# Patient Record
Sex: FEMALE | Race: WHITE | NOT HISPANIC OR LATINO | Employment: FULL TIME | URBAN - METROPOLITAN AREA
[De-identification: names, ages, dates, MRNs, and addresses within clinical notes are randomized per-mention and may not be internally consistent; named-entity substitution may affect disease eponyms.]

---

## 2017-01-30 LAB — HPV 16/18: NORMAL

## 2017-09-05 ENCOUNTER — OFFICE VISIT (OUTPATIENT)
Dept: URGENT CARE | Facility: CLINIC | Age: 37
End: 2017-09-05
Payer: COMMERCIAL

## 2017-09-05 VITALS
WEIGHT: 145 LBS | BODY MASS INDEX: 21.98 KG/M2 | SYSTOLIC BLOOD PRESSURE: 104 MMHG | HEART RATE: 73 BPM | DIASTOLIC BLOOD PRESSURE: 73 MMHG | OXYGEN SATURATION: 100 % | RESPIRATION RATE: 18 BRPM | TEMPERATURE: 97 F | HEIGHT: 68 IN

## 2017-09-05 DIAGNOSIS — R30.0 DYSURIA: ICD-10-CM

## 2017-09-05 DIAGNOSIS — N39.0 URINARY TRACT INFECTION WITHOUT HEMATURIA, SITE UNSPECIFIED: Primary | ICD-10-CM

## 2017-09-05 LAB
B-HCG UR QL: NEGATIVE
BILIRUB UR QL STRIP: NEGATIVE
CTP QC/QA: YES
GLUCOSE UR QL STRIP: NEGATIVE
KETONES UR QL STRIP: NEGATIVE
LEUKOCYTE ESTERASE UR QL STRIP: NEGATIVE
PH, POC UA: 5 (ref 5–8)
POC BLOOD, URINE: NEGATIVE
POC NITRATES, URINE: POSITIVE
PROT UR QL STRIP: NEGATIVE
SP GR UR STRIP: 1.02 (ref 1–1.03)
UROBILINOGEN UR STRIP-ACNC: 1 (ref 0.1–1.1)

## 2017-09-05 PROCEDURE — 99214 OFFICE O/P EST MOD 30 MIN: CPT | Mod: 25,S$GLB,, | Performed by: FAMILY MEDICINE

## 2017-09-05 PROCEDURE — 3008F BODY MASS INDEX DOCD: CPT | Mod: S$GLB,,, | Performed by: FAMILY MEDICINE

## 2017-09-05 PROCEDURE — 81003 URINALYSIS AUTO W/O SCOPE: CPT | Mod: QW,S$GLB,, | Performed by: FAMILY MEDICINE

## 2017-09-05 PROCEDURE — 81025 URINE PREGNANCY TEST: CPT | Mod: QW,S$GLB,, | Performed by: FAMILY MEDICINE

## 2017-09-05 RX ORDER — BUPROPION HYDROCHLORIDE 300 MG/1
300 TABLET ORAL DAILY
COMMUNITY
End: 2017-11-03 | Stop reason: SDUPTHER

## 2017-09-05 RX ORDER — NITROFURANTOIN 25; 75 MG/1; MG/1
100 CAPSULE ORAL 2 TIMES DAILY
Qty: 14 CAPSULE | Refills: 0 | Status: SHIPPED | OUTPATIENT
Start: 2017-09-05 | End: 2017-09-12

## 2017-09-05 RX ORDER — PHENAZOPYRIDINE HYDROCHLORIDE 100 MG/1
100 TABLET, FILM COATED ORAL 3 TIMES DAILY PRN
Qty: 9 TABLET | Refills: 0 | Status: SHIPPED | OUTPATIENT
Start: 2017-09-05 | End: 2017-09-08

## 2017-09-05 RX ORDER — ESCITALOPRAM OXALATE 20 MG/1
20 TABLET ORAL DAILY
COMMUNITY
End: 2017-11-03

## 2017-09-05 NOTE — PATIENT INSTRUCTIONS
Urinary Tract Infections in Women    Urinary tract infections (UTIs) are most often caused by bacteria (germs). These bacteria enter the urinary tract. The bacteria may come from outside the body. Or they may travel from the skin outside the rectum or vagina into the urethra. Female anatomy makes it easier for bacteria from the bowel to enter a womans urinary tract, which is the most common source of UTI. This means women develop UTIs more often than men. Pain in or around the urinary tract is a common UTI symptom. But the only way to know for sure if you have a UTI for the health care provider to test your urine. The two tests that may be done are the urinalysis and urine culture.  Types of UTIs  · Cystitis: A bladder infection (cystitis) is the most common UTI in women. You may have urgent or frequent urination. You may also have pain, burning when you urinate, and bloody urine.  · Urethritis: This is an inflamed urethra, which is the tube that carries urine from the bladder to outside the body. You may have lower stomach or back pain. You may also have urgent or frequent urination.  · Pyelonephritis: This is a kidney infection. If not treated, it can be serious and damage your kidneys. In severe cases, you may be hospitalized. You may have a fever and lower back pain.  Medications to treat a UTI  Most UTIs are treated with antibiotics. These kill the bacteria. The length of time you need to take them depends on the type of infection. It may be as short as 3 days. If you have repeated UTIs, a low-dose antibiotic may be needed for several months. Take antibiotics exactly as directed. Dont stop taking them until all of the medication is gone. If you stop taking the antibiotic too soon, the infection may not go away, and you may develop a resistance to the antibiotic. This can make it much harder to treat.  Lifestyle changes to treat and prevent UTIs  The lifestyle changes below will help get rid of your UTI.  They may also help prevent future UTIs.  · Drink plenty of fluids. This includes water, juice, or other caffeine-free drinks. Fluids help flush bacteria out of your body.  · Empty your bladder. Always empty your bladder when you feel the urge to urinate. And always urinate before going to sleep. Urine that stays in your bladder can lead to infection. Try to urinate before and after sex as well.  · Practice good personal hygiene. Wipe yourself from front to back after using the toilet. This helps keep bacteria from getting into the urethra.  · Use condoms during sex. These help prevent UTIs caused by sexually transmitted bacteria. Also, avoid using spermicides during sex. These can increase the risk of UTIs. Choose other forms of birth control instead. For women who tend to get UTIs after sex, a low-dose of a preventive antibiotic may be used. Be sure to discuss this option with your health care provider.  · Follow up with your health care provider as directed. He or she may test to make sure the infection has cleared. If necessary, additional treatment may be started.  Date Last Reviewed: 9/8/2014  © 4522-4357 [employer]. [address] [place] [address], [place] [address]. All rights reserved. This information is not intended as a substitute for professional medical care. Always follow your healthcare professional's instructions.

## 2017-09-05 NOTE — PROGRESS NOTES
"Subjective:       Patient ID: Cierra Irby is a 37 y.o. female.    Vitals:  height is 5' 8" (1.727 m) and weight is 65.8 kg (145 lb). Her oral temperature is 97.4 °F (36.3 °C). Her blood pressure is 104/73 and her pulse is 73. Her respiration is 18 and oxygen saturation is 100%.     Chief Complaint: Dysuria    Dysuria    This is a new problem. The current episode started acute onset. The problem has been gradually worsening. The quality of the pain is described as burning and aching. The pain is at a severity of 7/10. The pain is moderate. There has been no fever. Associated symptoms include nausea, urgency and vomiting. Pertinent negatives include no chills, discharge, flank pain, hematuria or possible pregnancy. Treatments tried: azo. The treatment provided no relief. There is no history of hypertension, recurrent UTIs, a single kidney or STD.     Review of Systems   Constitution: Negative for chills and fever.   Musculoskeletal: Negative for back pain.   Gastrointestinal: Positive for abdominal pain, nausea and vomiting.   Genitourinary: Positive for dysuria and urgency. Negative for flank pain, genital sores, hematuria, missed menses and non-menstrual bleeding.       Objective:      Physical Exam   Constitutional: She appears well-developed and well-nourished.   HENT:   Mouth/Throat: No posterior oropharyngeal erythema.   Cardiovascular: Normal rate, regular rhythm and normal heart sounds.    Pulmonary/Chest: Effort normal and breath sounds normal.   Abdominal: Soft. Bowel sounds are normal. There is no tenderness.   Nursing note and vitals reviewed.      Results for orders placed or performed in visit on 09/05/17   POCT Urinalysis, Dipstick, Automated, W/O Scope   Result Value Ref Range    POC Blood, Urine Negative Negative    POC Bilirubin, Urine Negative Negative    POC Urobilinogen, Urine 1.0 0.1 - 1.1    POC Ketones, Urine Negative Negative    POC Protein, Urine Negative Negative    POC Nitrates, Urine " Positive (A) Negative    POC Glucose, Urine Negative Negative    pH, UA 5.0 5 - 8    POC Specific Gravity, Urine 1.020 1.003 - 1.029    POC Leukocytes, Urine Negative Negative   POCT urine pregnancy   Result Value Ref Range    POC Preg Test, Ur Negative Negative     Acceptable Yes      Assessment:       1. Urinary tract infection without hematuria, site unspecified    2. Dysuria        Plan:         Urinary tract infection without hematuria, site unspecified  -     Urine culture  -     nitrofurantoin, macrocrystal-monohydrate, (MACROBID) 100 MG capsule; Take 1 capsule (100 mg total) by mouth 2 (two) times daily.  Dispense: 14 capsule; Refill: 0  -     phenazopyridine (PYRIDIUM) 100 MG tablet; Take 1 tablet (100 mg total) by mouth 3 (three) times daily as needed for Pain.  Dispense: 9 tablet; Refill: 0    Dysuria  -     POCT Urinalysis, Dipstick, Automated, W/O Scope  -     POCT urine pregnancy    advised patient that UA is NOT consistent with UTI, to start antibiotics if culture positive. Will call with results. Pyridium in the interim. Followup with worsening ysmptoms

## 2017-09-09 LAB
BACTERIA UR CULT: NO GROWTH
BACTERIA UR CULT: NORMAL

## 2017-09-10 ENCOUNTER — TELEPHONE (OUTPATIENT)
Dept: URGENT CARE | Facility: CLINIC | Age: 37
End: 2017-09-10

## 2017-09-10 NOTE — TELEPHONE ENCOUNTER
----- Message from Dara Carlson MA sent at 9/9/2017  9:21 AM CDT -----      ----- Message -----  From: Andres Gregorio MD  Sent: 9/9/2017   9:09 AM  To: [facility] Clinical Support, #    These results are normal. Please notify the patient.

## 2017-09-12 ENCOUNTER — TELEPHONE (OUTPATIENT)
Dept: URGENT CARE | Facility: CLINIC | Age: 37
End: 2017-09-12

## 2017-09-12 NOTE — TELEPHONE ENCOUNTER
----- Message from Dara Carlson MA sent at 9/9/2017  9:21 AM CDT -----      ----- Message -----  From: Andres Gregorio MD  Sent: 9/9/2017   9:09 AM  To: [facility] Clinical Support, #    These results are normal. Please notify the patient.

## 2017-09-12 NOTE — TELEPHONE ENCOUNTER
----- Message from Dara Carlson MA sent at 9/9/2017  9:21 AM CDT -----      ----- Message -----  From: Andres Gregorio MD  Sent: 9/9/2017   9:09 AM  To: [facility] Clinical Support, #    These results are normal. Please notify the patient.

## 2017-09-18 ENCOUNTER — OFFICE VISIT (OUTPATIENT)
Dept: NEUROLOGY | Facility: CLINIC | Age: 37
End: 2017-09-18
Payer: COMMERCIAL

## 2017-09-18 VITALS
DIASTOLIC BLOOD PRESSURE: 71 MMHG | BODY MASS INDEX: 22.32 KG/M2 | SYSTOLIC BLOOD PRESSURE: 103 MMHG | HEART RATE: 58 BPM | HEIGHT: 68 IN | WEIGHT: 147.25 LBS

## 2017-09-18 DIAGNOSIS — G43.709 CHRONIC MIGRAINE WITHOUT AURA, WITHOUT MENTION OF INTRACTABLE MIGRAINE WITHOUT MENTION OF STATUS MIGRAINOSUS: Primary | ICD-10-CM

## 2017-09-18 DIAGNOSIS — G47.9 TROUBLE IN SLEEPING: ICD-10-CM

## 2017-09-18 DIAGNOSIS — N20.0 KIDNEY STONES: ICD-10-CM

## 2017-09-18 DIAGNOSIS — G43.709 CHRONIC MIGRAINE WITHOUT AURA WITHOUT STATUS MIGRAINOSUS, NOT INTRACTABLE: Primary | ICD-10-CM

## 2017-09-18 DIAGNOSIS — Z87.442 HISTORY OF KIDNEY STONES: ICD-10-CM

## 2017-09-18 PROCEDURE — 99999 PR PBB SHADOW E&M-EST. PATIENT-LVL III: CPT | Mod: PBBFAC,,, | Performed by: NURSE PRACTITIONER

## 2017-09-18 PROCEDURE — 99205 OFFICE O/P NEW HI 60 MIN: CPT | Mod: S$GLB,,, | Performed by: NURSE PRACTITIONER

## 2017-09-18 PROCEDURE — 3008F BODY MASS INDEX DOCD: CPT | Mod: S$GLB,,, | Performed by: NURSE PRACTITIONER

## 2017-09-18 RX ORDER — ONDANSETRON 4 MG/1
4 TABLET, ORALLY DISINTEGRATING ORAL
COMMUNITY
End: 2018-02-02 | Stop reason: ALTCHOICE

## 2017-09-18 RX ORDER — ALPRAZOLAM 0.5 MG/1
0.5 TABLET ORAL 2 TIMES DAILY PRN
COMMUNITY
End: 2017-11-03

## 2017-09-18 RX ORDER — MECLIZINE HYDROCHLORIDE 25 MG/1
25 TABLET ORAL 3 TIMES DAILY PRN
COMMUNITY
End: 2018-05-24 | Stop reason: SDUPTHER

## 2017-09-18 RX ORDER — ZOLMITRIPTAN 5 MG/1
SPRAY NASAL
Qty: 12 EACH | Refills: 5 | Status: SHIPPED | OUTPATIENT
Start: 2017-09-18 | End: 2017-11-03

## 2017-09-18 NOTE — PATIENT INSTRUCTIONS
Supplements to Consider:  Vitamin B2 400 mg   Co-Q10 200 mg     - Please download Migraine Rory aracelis on phone and begin tracking your headaches

## 2017-09-18 NOTE — PROGRESS NOTES
"SUBJECTIVE:  Patient ID: Cierra Irby   MRN: [id number]  Referred By: Aaarefagustin Self    Chief Complaint: Consult    History of Present Illness:   37 y.o. female with history of migraines, anxiety, and vertigo, presents to clinic alone for evaluation of her headaches.  Headaches initially began when she was in , but then this went away until 2002, when her headaches returned.  Recently moved here from [place], previous patient of [facility] headache center.  She is currently doing Botox, started in May, 3rd round of Botox due in October.  Botox was doing well for treatment, but with the stress of her move to [place], she feels things have kind of gotten mixed up.  She states "this has been one of the worst years I have had, it hasn't been this bad since 2009."  Got very sick in February and had infusion treatments done. Migraines are always right sided, in her forehead, temple, but can be occipitally located.  Pain is described as throbbing, feels like she can feel her veins pulsing.  Onset can be over 30 minutes, but can be longer like hours, depending on what type of headaches she is having.  When she gets a headache, she tries not to take anything, just to prevent the bounce backs. In the past she has taken Hydrocodone and other pain medicatinos. States Hydrocodone works the most, but she tries not to take it as it makes her feel itchy.  Headaches are at least 5x/week, gets a full blown migraine about 1-2 times per week.  Migraines last at least 24 hours, but can be up to 3 days.  Since beginning the Botox, they have not been lasting 3 days.  Before she started the Botox, she could not get out of the cycle of her headache.  In the past she has experienced an aura, but does not typically experience an aura any longer.  Headaches are typically worse from August to March, and are worsened with movement.  Pain can be anywhere from 3-10 out of 10.  She is currently able to bear children and is not taking " oral contraceptive pills, however she is not trying to get pregnant and her and her boyfriend do use protection.  Associated Symptoms - nausea, vertigo, photo/phonophobia, cannot think, vomit, neck pain  Triggers - stress, rain, flying, lemon, detergent smells, strong perfume smells, rollercoaster   Aggravating Factors - moving, looking up and down, any noise/smells/lights   Alleviating Factors - an ice cap, peppermint oil helps with nausea, laying down, eating helps   Head Trauma? Infection? Fever? Cancer? Pregnancy? <-- had 3 minor concussions and then another 4 years ago   Recent Changes - Recently moved from [place] to [place] (for boyfriend),   Sleep - does better when she takes the Zanaflex, does not feel rested in the morning, sleeps on average 6-7 hours per night   Family Hx of Migraines (mom)  Positives in bold: Hx of Kidney Stones, asthma, GI bleed (stomach ulcers in the past), osteoporosis, CAD/MI, CVA/TIA, DM     Treatments Tried and Response  Imitrex - no help, made her feel worse  Maxalt - no  Zomig PO - no  Sprix - kind of worked  Cambia - no   Topamax - gave her many kidney stones   Lithium - was given for her migraine   Wellbutrin - for anxiety  Lexapro - for anxiety   Verapamil - no help   Depakote - no   Magnesium - has to look at dose   Zanaflex - +/-    Social History  Alcohol - yes, rarely, wine is a trigger   Smoke - denies   Recreational Drug Use- denies     Current Medications:    alprazolam (XANAX) 0.5 MG tablet, Take 0.5 mg by mouth 2 (two) times daily as needed for Anxiety., Disp: , Rfl:     meclizine (ANTIVERT) 25 mg tablet, Take 25 mg by mouth 3 (three) times daily as needed., Disp: , Rfl:     ondansetron (ZOFRAN ODT) 4 MG TbDL, Take 4 mg by mouth every 12 (twelve) hours., Disp: , Rfl:     buPROPion (WELLBUTRIN XL) 300 MG 24 hr tablet, Take 300 mg by mouth once daily., Disp: , Rfl:     escitalopram oxalate (LEXAPRO) 20 MG tablet, Take 20 mg by mouth once daily., Disp: , Rfl:     " zolmitriptan (ZOMIG) 5 mg Spry, 1 spray at onset of migraine, can repeat 2 hours later if needed.  No more than 2 sprays/day, no more than 3 days/week., Disp: 12 each, Rfl: 5    Current Facility-Administered Medications:     [START ON 10/19/2017] onabotulinumtoxina injection 200 Units, 200 Units, Intramuscular, Q90 Days, KIRA Bowers    Review of Systems   Review of Systems   Constitutional: Positive for fatigue. Negative for activity change, chills, diaphoresis and fever.   HENT: Negative for congestion, ear pain, facial swelling, hearing loss, rhinorrhea, sinus pressure, tinnitus and voice change.         +phonophobia   Eyes: Positive for photophobia and visual disturbance. Negative for pain, discharge and redness.   Respiratory: Negative for chest tightness and shortness of breath.    Cardiovascular: Negative for chest pain and palpitations.   Gastrointestinal: Positive for nausea and vomiting.   Genitourinary: Negative for difficulty urinating and dysuria.   Musculoskeletal: Positive for neck pain and neck stiffness. Negative for back pain, gait problem and myalgias.   Skin: Negative for pallor.   Allergic/Immunologic: Negative for immunocompromised state.   Neurological: Positive for dizziness and headaches. Negative for facial asymmetry, weakness, light-headedness and numbness.   Psychiatric/Behavioral: Positive for decreased concentration and sleep disturbance. Negative for agitation, behavioral problems, confusion, dysphoric mood and hallucinations. The patient is nervous/anxious. The patient is not hyperactive.      OBJECTIVE:  Vitals:  /71   Pulse (!) 58   Ht 5' 8" (1.727 m)   Wt 66.8 kg (147 lb 4.3 oz)   BMI 22.39 kg/m²     Physical Exam   Constitutional: She appears well-developed and well-nourished. She is well groomed. NAD  HENT:    Head: Normocephalic and atraumatic, oral and nasal mucosa intact.  Frontalis was NTTP, temporalis was NTTP   Eyes: Conjunctivae and EOM are normal. " Pupils are equal, round, and reactive to light   Neck: Neck supple.Occiput and trapezius NTTP   Musculoskeletal: Normal range of motion. No joint stiffness. No vertebral point tenderness.  Skin: Skin is warm and dry.  Psychiatric: Normal mood and affect.     Neuro exam:    Mental status:  The patient is awake, alert, and oriented to person, place and time.  Language is intact and fluent  Remote and recent memory are intact  Normal attention and concentration  Mood is stable    Cranial Nerves:  Fundoscopic examination does not reveal any occult papilledema.    Pupils are equal and reactive to light.    Extraocular movements are intact and without nystagmus.    Visual fields are full to confrontation testing.   Facial movement is symmetric.  Facial sensation is intact.    Hearing is intact to finger rub   Uvula in midline. Tongue in midline without fasiculation  FROM of neck in all (6) directions.  Shoulder shrug symmetrical.    Coordination:     Finger to nose - normal and symmetric bilaterally   Heel to shin test - normal and symmetric bilaterally     Motor:  Normal muscle bulk and symmetry. No fasciculations were noted.   Tremor not apparent   Pronator drift not apparent.    strength was strong and symmetric   Finger extension strength was strong and symmetric   RUE:appropriate against gravity and medium force as tested 5-/5  LUE: appropriate against gravity and medium force as tested 5-/5  RLE:appropriate against gravity and medium force as tested 5-/5              LLE: appropriate against gravity and medium force as tested 5-/5    Reflexes:  Right Brachioradialis 2+  Left Brachioradialis 2+  Right Biceps 2+  Left Biceps 2+  Right Patellar2+  Left Patellar 2+  Right Achilles 2+  Left Achilles 2+                          Plantar flexion bilat   Cevallos was negative      Sensory:  RUE  intact light touch and temperature  LUE intact light touch and temperature    RLE intact light touch  LLE intact light  touch    Gait:   Romberg - negative   Normal gait  Tandem, Heel, and Toe Walk - able to perform without difficulty     ASSESSMENT:  1. Chronic migraine without aura without status migrainosus, not intractable    2. Trouble in sleeping    3. History of kidney stones      Medical Decision Making:  BOTOX  The patient has chronic migraines (G43.719) and suffers from headaches more than 15 days a month lasting more than 4 hours a day with no relief of symptoms despite trying multiple medications including Imitrex, maxalt, Zomig, wellbutrin, lexapro, verapamil, sprix, Cambia, topamax, lithium, wellbutrin, lexapro, verapamil, depakote, Magnesium, and Zanaflex among others. Botox treatment was approved for chronic migraines in October 2010. The patient will be an ideal candidate for Botox. We are planning for 3 treatments 3 months apart and aiming for at least 50% improvement in the symptoms. If we see no improvement after 3 treatments, we will discontinue the injections.    PLAN:  - Seek approval for Botox injections for Chronic migraine   - For migraine abortive - trial Zomig nasal spray   1 spray at onset of migraine, can repeat 2 hours later if needed   No more than 2 sprays/day or 3 days/week   - Supplements to consider - Vitamin B2 400 mg & Co-Q10 200 mg daily   - Start tracking headaches via Migraine Rory aracelis on phone   - Continue all other medications as directed   - RTC in 1 month or sooner if needed     Orders Placed This Encounter    zolmitriptan (ZOMIG) 5 mg Spry     I have discussed realistic goals of care with patient at length as well as medication options, and need for lifestyle adjustment. I have explained that treatment will take time. We have agreed that the goal will be to reduce frequency/intensity/quantity of HA, not to be completely HA free. I have explained my non narcotic policy regarding headache treatment.    Patient to track frequency of headaches.  Patient agreeable to work on lifestyle  adjustments.    Discussed potential for teratogenicity with treatment, patient understands if her family planning status should change she will contact office immediately and we will safely adjust medications as needed.     I spent 60 minutes face-to-face with the patient with >50% of the time spent with counseling and education regarding:  - results of data, diagnosis, and recommendations stated above  - risks, benefits, and potential side effects of Zomig nasal spray and Botox injections  - importance of healthy diet, regular exercise and sleep hygiene in the treatment of headaches      All questions and concerns addressed.  Patient verbalizes understanding and is agreeable to the plan.       Ellen Bueno, FNP-C  [facility]  [phone]    Dr. Wheat was available during today's encounter.

## 2017-09-20 PROBLEM — N20.0 KIDNEY STONES: Status: ACTIVE | Noted: 2017-09-20

## 2017-09-27 ENCOUNTER — PATIENT MESSAGE (OUTPATIENT)
Dept: NEUROLOGY | Facility: CLINIC | Age: 37
End: 2017-09-27

## 2017-09-30 ENCOUNTER — OFFICE VISIT (OUTPATIENT)
Dept: URGENT CARE | Facility: CLINIC | Age: 37
End: 2017-09-30
Payer: COMMERCIAL

## 2017-09-30 VITALS
TEMPERATURE: 98 F | WEIGHT: 140 LBS | BODY MASS INDEX: 21.22 KG/M2 | OXYGEN SATURATION: 100 % | HEIGHT: 68 IN | SYSTOLIC BLOOD PRESSURE: 106 MMHG | HEART RATE: 66 BPM | DIASTOLIC BLOOD PRESSURE: 65 MMHG | RESPIRATION RATE: 18 BRPM

## 2017-09-30 DIAGNOSIS — G43.709 CHRONIC MIGRAINE WITHOUT AURA WITHOUT STATUS MIGRAINOSUS, NOT INTRACTABLE: Primary | ICD-10-CM

## 2017-09-30 PROCEDURE — 99214 OFFICE O/P EST MOD 30 MIN: CPT | Mod: 25,S$GLB,, | Performed by: FAMILY MEDICINE

## 2017-09-30 PROCEDURE — 3008F BODY MASS INDEX DOCD: CPT | Mod: S$GLB,,, | Performed by: FAMILY MEDICINE

## 2017-09-30 PROCEDURE — 96372 THER/PROPH/DIAG INJ SC/IM: CPT | Mod: S$GLB,,, | Performed by: FAMILY MEDICINE

## 2017-09-30 RX ORDER — KETOROLAC TROMETHAMINE 30 MG/ML
30 INJECTION, SOLUTION INTRAMUSCULAR; INTRAVENOUS
Status: COMPLETED | OUTPATIENT
Start: 2017-09-30 | End: 2017-09-30

## 2017-09-30 RX ORDER — METHYLPREDNISOLONE 4 MG/1
4 TABLET ORAL DAILY
Qty: 1 PACKAGE | Refills: 0 | Status: SHIPPED | OUTPATIENT
Start: 2017-09-30 | End: 2018-01-12 | Stop reason: ALTCHOICE

## 2017-09-30 RX ADMIN — KETOROLAC TROMETHAMINE 30 MG: 30 INJECTION, SOLUTION INTRAMUSCULAR; INTRAVENOUS at 03:09

## 2017-09-30 NOTE — PATIENT INSTRUCTIONS

## 2017-09-30 NOTE — PROGRESS NOTES
"Subjective:       Patient ID: Cierra Irby is a 37 y.o. female.    Vitals:  height is 5' 8" (1.727 m) and weight is 63.5 kg (140 lb). Her oral temperature is 98.1 °F (36.7 °C). Her blood pressure is 106/65 and her pulse is 66. Her respiration is 18 and oxygen saturation is 100%.     Chief Complaint: Headache    Patient states she has been have a headache for about 3 weeks. Patient states she did not take any medication. Patient states over the past couple of days the headache has worsen. Hx of chronic migraine and patient is establishing relationship with a neurologist for management . Botox treatment to date has been ineffective      Headache    This is a new problem. The current episode started 1 to 4 weeks ago. The problem occurs constantly. The problem has been gradually worsening. Pain location: Back of head on the right side. The pain does not radiate. The pain quality is similar to prior headaches. The quality of the pain is described as throbbing and shooting. The pain is at a severity of 9/10. The pain is severe. Associated symptoms include nausea. Pertinent negatives include no abdominal pain, back pain, blurred vision, fever, sore throat or vomiting. The symptoms are aggravated by bright light. She has tried nothing for the symptoms. The treatment provided no relief.     Review of Systems   Constitution: Negative for chills and fever.   HENT: Negative for sore throat.    Eyes: Negative for blurred vision.   Cardiovascular: Negative for chest pain.   Respiratory: Negative for shortness of breath.    Skin: Negative for rash.   Musculoskeletal: Negative for back pain and joint pain.   Gastrointestinal: Positive for nausea. Negative for abdominal pain, diarrhea and vomiting.   Neurological: Positive for headaches.   Psychiatric/Behavioral: The patient is not nervous/anxious.        Objective:      Physical Exam   Constitutional: She is oriented to person, place, and time. She appears well-developed and " well-nourished.   HENT:   Head: Normocephalic and atraumatic.   Eyes: EOM are normal. Pupils are equal, round, and reactive to light.   Neck: Normal range of motion. Neck supple.   Cardiovascular: Normal rate, regular rhythm and normal heart sounds.    Pulmonary/Chest: Effort normal and breath sounds normal.   Abdominal: Soft.   Neurological: She is alert and oriented to person, place, and time. She displays normal reflexes. No cranial nerve deficit or sensory deficit. She exhibits normal muscle tone. Coordination normal.   Nursing note and vitals reviewed.      Assessment:       1. Chronic migraine without aura without status migrainosus, not intractable        Plan:         Chronic migraine without aura without status migrainosus, not intractable  -     ketorolac injection 30 mg; Inject 30 mg into the muscle one time.  -     methylPREDNISolone (MEDROL DOSEPACK) 4 mg tablet; Take 1 tablet (4 mg total) by mouth once daily. use as directed  Dispense: 1 Package; Refill: 0      To see Neurology

## 2017-10-09 ENCOUNTER — PATIENT MESSAGE (OUTPATIENT)
Dept: NEUROLOGY | Facility: CLINIC | Age: 37
End: 2017-10-09

## 2017-10-09 DIAGNOSIS — G43.709 CHRONIC MIGRAINE WITHOUT AURA WITHOUT STATUS MIGRAINOSUS, NOT INTRACTABLE: Primary | ICD-10-CM

## 2017-10-09 PROCEDURE — 96372 THER/PROPH/DIAG INJ SC/IM: CPT | Mod: S$GLB,,, | Performed by: NURSE PRACTITIONER

## 2017-10-09 RX ORDER — METHYLPREDNISOLONE ACETATE 80 MG/ML
80 INJECTION, SUSPENSION INTRA-ARTICULAR; INTRALESIONAL; INTRAMUSCULAR; SOFT TISSUE
Status: COMPLETED | OUTPATIENT
Start: 2017-10-09 | End: 2017-10-09

## 2017-10-09 RX ORDER — KETOROLAC TROMETHAMINE 30 MG/ML
60 INJECTION, SOLUTION INTRAMUSCULAR; INTRAVENOUS
Status: COMPLETED | OUTPATIENT
Start: 2017-10-09 | End: 2017-10-09

## 2017-10-09 RX ADMIN — KETOROLAC TROMETHAMINE 60 MG: 30 INJECTION, SOLUTION INTRAMUSCULAR; INTRAVENOUS at 03:10

## 2017-10-09 RX ADMIN — METHYLPREDNISOLONE ACETATE 80 MG: 80 INJECTION, SUSPENSION INTRA-ARTICULAR; INTRALESIONAL; INTRAMUSCULAR; SOFT TISSUE at 03:10

## 2017-10-09 NOTE — NURSING
"Patient here in clinic.Depro-Medrol given in right outer quad of gluteus.Slight amount of bleeding noted at site.She denies taking any aspirin or NSAID's.Toradol given in left outer gluteal area.She did state that she had Toradol the other day at urgent care.Site clear with band aids applied to sites.2 Patient identifiers and allergies reviewed.Patient anxious states pain is 7 on scale.Before injections given,explinations given on medications and that there may be pain due to injection but also the constitution of medications itself.After injections given-Patient stated she felt "faint"She sat down and cold water provided for her.Very anxious-support given to her.Patient states that she recently moved here from [place] and the area is new to her.Allowed her to talk and support provided.She related that when she went to urgent care her pain level was 10.After waiting for 15 minutes she stated her pain level today remains a 7.Instructed her to notify clinic for any concerns,needs, or questions.Instructed her to remove band aids when she gets home to avoid any possible skin irritation.  "

## 2017-10-12 RX ORDER — PREDNISONE 20 MG/1
TABLET ORAL
Qty: 30 TABLET | Refills: 0 | Status: SHIPPED | OUTPATIENT
Start: 2017-10-12 | End: 2018-01-12 | Stop reason: ALTCHOICE

## 2017-10-18 ENCOUNTER — PATIENT MESSAGE (OUTPATIENT)
Dept: NEUROLOGY | Facility: CLINIC | Age: 37
End: 2017-10-18

## 2017-10-19 ENCOUNTER — PROCEDURE VISIT (OUTPATIENT)
Dept: NEUROLOGY | Facility: CLINIC | Age: 37
End: 2017-10-19
Payer: COMMERCIAL

## 2017-10-19 VITALS
HEART RATE: 76 BPM | HEIGHT: 68 IN | WEIGHT: 143.06 LBS | SYSTOLIC BLOOD PRESSURE: 108 MMHG | BODY MASS INDEX: 21.68 KG/M2 | DIASTOLIC BLOOD PRESSURE: 76 MMHG

## 2017-10-19 DIAGNOSIS — R11.0 NAUSEA: ICD-10-CM

## 2017-10-19 DIAGNOSIS — F41.9 ANXIETY: ICD-10-CM

## 2017-10-19 PROCEDURE — 64615 CHEMODENERV MUSC MIGRAINE: CPT | Mod: S$GLB,,, | Performed by: NURSE PRACTITIONER

## 2017-10-19 PROCEDURE — 96372 THER/PROPH/DIAG INJ SC/IM: CPT | Mod: 59,S$GLB,, | Performed by: NURSE PRACTITIONER

## 2017-10-19 PROCEDURE — 99213 OFFICE O/P EST LOW 20 MIN: CPT | Mod: 25,S$GLB,, | Performed by: NURSE PRACTITIONER

## 2017-10-19 RX ORDER — PROMETHAZINE HYDROCHLORIDE 25 MG/ML
25 INJECTION, SOLUTION INTRAMUSCULAR; INTRAVENOUS
Status: COMPLETED | OUTPATIENT
Start: 2017-10-19 | End: 2017-10-19

## 2017-10-19 RX ORDER — TIZANIDINE 4 MG/1
TABLET ORAL
Refills: 0 | COMMUNITY
Start: 2017-10-13 | End: 2018-04-30 | Stop reason: SDUPTHER

## 2017-10-19 RX ADMIN — PROMETHAZINE HYDROCHLORIDE 25 MG: 25 INJECTION, SOLUTION INTRAMUSCULAR; INTRAVENOUS at 11:10

## 2017-10-19 NOTE — PROCEDURES
SUBJECTIVE/OBJECTIVE:  Patient ID: Cierra Irby  Chief Complaint: Botulinum Toxin Injection    History of Present Illness:  37 y.o. female with history of chronic migraines, kidney stones, depression, anxiety, and trouble sleeping, who presents to clinic alone for initiation of Botox injections for chronic migraine.     Interval History:  - Migraines have been awful since the last office visit, sent prednisone taper last week, has been taking daily which has been providing her with some relief, but she still continues to get pain nearly everyday.  Zomig nasal spray did not help her at all.  States she is currently having intense pressure across her face.  States she is feeling slightly nauseated, took the prednisone this morning, but did not eat breakfast.  After discussing risks, benefits, potential side effects of Botox injections, she is agreeable to move forward with the injections.      Current Medications:    alprazolam (XANAX) 0.5 MG tablet, Take 0.5 mg by mouth 2 (two) times daily as needed for Anxiety., Disp: , Rfl:     buPROPion (WELLBUTRIN XL) 300 MG 24 hr tablet, Take 300 mg by mouth once daily., Disp: , Rfl:     escitalopram oxalate (LEXAPRO) 20 MG tablet, Take 20 mg by mouth once daily., Disp: , Rfl:     FLUVIRIN [id number], PF, 45 mcg (15 mcg x 3)/0.5 mL Syrg, ADM 0.5ML IM UTD, Disp: , Rfl: 0    meclizine (ANTIVERT) 25 mg tablet, Take 25 mg by mouth 3 (three) times daily as needed., Disp: , Rfl:     methylPREDNISolone (MEDROL DOSEPACK) 4 mg tablet, Take 1 tablet (4 mg total) by mouth once daily. use as directed, Disp: 1 Package, Rfl: 0    ondansetron (ZOFRAN ODT) 4 MG TbDL, Take 4 mg by mouth every 12 (twelve) hours., Disp: , Rfl:     predniSONE (DELTASONE) 20 MG tablet, 3 tabs x 5 days, then 2 tabs x 5 days, then 1 tab x 5 days.  Take in the morning with food., Disp: 30 tablet, Rfl: 0    tizanidine (ZANAFLEX) 4 MG tablet, TK 1/2 TO 1 T PO QD HS PRF MUSCLE SPASM, Disp: , Rfl: 0     "zolmitriptan (ZOMIG) 5 mg Spry, 1 spray at onset of migraine, can repeat 2 hours later if needed.  No more than 2 sprays/day, no more than 3 days/week., Disp: 12 each, Rfl: 5    UNABLE TO FIND, Mindfulness-Based Stress Reduction class to help with stress reduction and migraine prevention., Disp: 1 Units, Rfl: 5    UNABLE TO FIND, Cefaly + Electrodes. Wear 20-30 minutes daily.  Dx: Migraines. [email or web address], Disp: 1 Device, Rfl: 2    Current Facility-Administered Medications:     onabotulinumtoxina injection 200 Units, 200 Units, Intramuscular, Q90 Days, Ellentawnya Bueno, FNP, 200 Units at 10/19/17 1143    Vitals:  /76   Pulse 76   Ht 5' 8" (1.727 m)   Wt 64.9 kg (143 lb 1.3 oz)   LMP 09/18/2017   BMI 21.76 kg/m²     BOTOX was performed as an indicated therapy for intractable chronic migraine headaches given that the patient failed more than 2 headache medications    Two patient identifiers were confirmed with the patient prior to performing this procedure. A time out to determine correct patient and and agreement on procedure performed was conducted prior to the procedure.      Botulinum Toxin Injection Procedure   Procedure: Chemical neurolysis   After risks and benefits were explained including bleeding, infection, worsening of pain, damage to the areas being injected, weakness of muscles, loss of muscle control, dysphagia if injecting the head or neck, facial droop if injecting the facial area, painful injection, allergic or other reaction to the medications being injected, and the failure of the procedure to help the problem, a signed consent was obtained.   The patient was placed in a comfortable area and the sites to be treated were identified.The area to be treated was prepped three times with alcohol and the alcohol allowed to dry. Next, a 30 gauge needle was used to inject the medication in the area to be treated.      Total Botox used: 125 Units   Botox wastage: 75 Units     Injection sites: "    muscle bilaterally ( a total of 10 units divided into 2 sites)   Procerus muscle (5 units)   Frontalis muscle bilaterally (a total of 20 units divided into 4 sites)   Temporalis muscle bilaterally (a total of 40 units divided into 8 sites)   Occipitalis muscle bilaterally (a total of 30 units divided into 6 sites)   Cervical paraspinal muscles (a total of 20 units divided into 4 sites)   Trapezius muscles SKIPPED per patient's request    Complications: none   RTC for the next Botox injection: 3 months     Mid Botox injections, we had to stop injections as patient complains intense nausea.  She requests something for nausea, offered the only thing I can provide her is phenergan injection, however she will need to find an alternate ride home from clinic, she states she would like the phenergan injection and will call her boyfriend to come pick her up.  Again re-enforced the importance of eating breakfast with steroids as well as prior to receiving next round of Botox injections.  She is very anxious in the clinic and requests prescription for Mindfulness-based stress reduction courses which is provided to her.  Discussed cefaly device and initiating this therapy to provide additional benefit with regards to her migraines which she is also agreeable to.      ASSESSMENT:  1. Chronic migraine    2. Anxiety    3. Nausea      PLAN:  - Botox performed in clinic   - Phenergan 25 mg/mL administered in clinic, she was monitored for 15 minutes following injection, boyfriend came to clinic to pick her up, she understands she cannot drive   - Prescription for mindfulness-based stress reduction courses given at request of patient   - Prescription for Cefaly device and electrodes given  - recommended she use for 20 minutes per day   - Continue all medications as directed  - Recommend she eat full meal prior to next set of injections, and if she feels she will become nauseated regularly with injections to have someone  accompany her   - RTC in 6 weeks or sooner if needed     Orders Placed This Encounter    UNABLE TO FIND    promethazine injection 25 mg    UNABLE TO FIND     All questions and concerns addressed.  Patient verbalizes understanding and is agreeable with the above stated treatment plan.      CC: Primary Doctor No Elizabeth C Vulevich, FNP-C [facility]   [phone]

## 2017-11-03 ENCOUNTER — OFFICE VISIT (OUTPATIENT)
Dept: PSYCHIATRY | Facility: CLINIC | Age: 37
End: 2017-11-03
Payer: COMMERCIAL

## 2017-11-03 VITALS
BODY MASS INDEX: 22.22 KG/M2 | WEIGHT: 146.63 LBS | HEIGHT: 68 IN | HEART RATE: 86 BPM | DIASTOLIC BLOOD PRESSURE: 72 MMHG | SYSTOLIC BLOOD PRESSURE: 116 MMHG

## 2017-11-03 DIAGNOSIS — F43.10 PTSD (POST-TRAUMATIC STRESS DISORDER): ICD-10-CM

## 2017-11-03 DIAGNOSIS — F33.41 MDD (MAJOR DEPRESSIVE DISORDER), RECURRENT, IN PARTIAL REMISSION: ICD-10-CM

## 2017-11-03 DIAGNOSIS — F41.1 GAD (GENERALIZED ANXIETY DISORDER): ICD-10-CM

## 2017-11-03 PROCEDURE — 99999 PR PBB SHADOW E&M-EST. PATIENT-LVL III: CPT | Mod: PBBFAC,,, | Performed by: PSYCHIATRY & NEUROLOGY

## 2017-11-03 PROCEDURE — 99204 OFFICE O/P NEW MOD 45 MIN: CPT | Mod: S$GLB,,, | Performed by: PSYCHIATRY & NEUROLOGY

## 2017-11-03 RX ORDER — ALPRAZOLAM 0.5 MG/1
0.25 TABLET ORAL 2 TIMES DAILY PRN
Qty: 30 TABLET | Refills: 2 | Status: SHIPPED | OUTPATIENT
Start: 2017-11-03 | End: 2017-12-28 | Stop reason: SDUPTHER

## 2017-11-03 RX ORDER — DULOXETIN HYDROCHLORIDE 30 MG/1
90 CAPSULE, DELAYED RELEASE ORAL DAILY
Qty: 30 CAPSULE | Refills: 1 | Status: SHIPPED | OUTPATIENT
Start: 2017-11-03 | End: 2017-12-14 | Stop reason: SDUPTHER

## 2017-11-03 RX ORDER — BUPROPION HYDROCHLORIDE 300 MG/1
300 TABLET ORAL DAILY
Qty: 30 TABLET | Refills: 2 | Status: SHIPPED | OUTPATIENT
Start: 2017-11-03 | End: 2017-12-28 | Stop reason: SDUPTHER

## 2017-11-03 RX ORDER — PRAZOSIN HYDROCHLORIDE 1 MG/1
1 CAPSULE ORAL NIGHTLY
Qty: 30 CAPSULE | Refills: 1 | Status: SHIPPED | OUTPATIENT
Start: 2017-11-03 | End: 2017-12-28 | Stop reason: SDUPTHER

## 2017-11-03 NOTE — PATIENT INSTRUCTIONS
Continue taking Wellbutrin XL 300mg daily    Taper down on Lexapro (break 20mg tablets in half), take half tablet (10mg) daily for one week, at the same time, start Cymbalta 30mg daily.  After first week, stop Lexapro and go up to Cymbalta 60mg (2 tablets daily), after second week, go up to Cymbalta 90mg daily (3 tablets) and continue.      Continue Xanax 0.25mg twice daily as need.      Schedule with Cristopher Rogers for couples therapy, tell  you have been referred.    [facility] [phone]    Make f/u appointment 6 weeks - 2 months
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
## 2017-11-30 ENCOUNTER — PATIENT MESSAGE (OUTPATIENT)
Dept: PSYCHIATRY | Facility: CLINIC | Age: 37
End: 2017-11-30

## 2017-12-12 ENCOUNTER — PATIENT MESSAGE (OUTPATIENT)
Dept: PSYCHIATRY | Facility: CLINIC | Age: 37
End: 2017-12-12

## 2017-12-14 DIAGNOSIS — F43.10 PTSD (POST-TRAUMATIC STRESS DISORDER): ICD-10-CM

## 2017-12-14 DIAGNOSIS — F33.41 MDD (MAJOR DEPRESSIVE DISORDER), RECURRENT, IN PARTIAL REMISSION: ICD-10-CM

## 2017-12-14 DIAGNOSIS — F41.1 GAD (GENERALIZED ANXIETY DISORDER): ICD-10-CM

## 2017-12-14 RX ORDER — DULOXETIN HYDROCHLORIDE 30 MG/1
90 CAPSULE, DELAYED RELEASE ORAL DAILY
Qty: 90 CAPSULE | Refills: 1 | Status: SHIPPED | OUTPATIENT
Start: 2017-12-14 | End: 2017-12-28 | Stop reason: SDUPTHER

## 2017-12-14 NOTE — TELEPHONE ENCOUNTER
Contacted by patient via [facility] messenger for refill of Cymbalta 90mg.  Will send updated prescription with one refill to patient's pharmacy, [phone].  Will message patient to make her aware of update.    Teetee Bolivar D.O.  [facility]""[facility]  [phone]    12/14/2017  8:25 AM

## 2017-12-28 ENCOUNTER — OFFICE VISIT (OUTPATIENT)
Dept: PSYCHIATRY | Facility: CLINIC | Age: 37
End: 2017-12-28
Payer: COMMERCIAL

## 2017-12-28 VITALS
DIASTOLIC BLOOD PRESSURE: 59 MMHG | BODY MASS INDEX: 23.16 KG/M2 | HEIGHT: 68 IN | SYSTOLIC BLOOD PRESSURE: 105 MMHG | WEIGHT: 152.81 LBS | HEART RATE: 81 BPM

## 2017-12-28 DIAGNOSIS — F41.0 PANIC ATTACKS: ICD-10-CM

## 2017-12-28 DIAGNOSIS — F43.10 PTSD (POST-TRAUMATIC STRESS DISORDER): ICD-10-CM

## 2017-12-28 DIAGNOSIS — F33.41 MDD (MAJOR DEPRESSIVE DISORDER), RECURRENT, IN PARTIAL REMISSION: ICD-10-CM

## 2017-12-28 DIAGNOSIS — F41.1 GAD (GENERALIZED ANXIETY DISORDER): ICD-10-CM

## 2017-12-28 PROCEDURE — 99214 OFFICE O/P EST MOD 30 MIN: CPT | Mod: S$GLB,,, | Performed by: PSYCHIATRY & NEUROLOGY

## 2017-12-28 PROCEDURE — 99999 PR PBB SHADOW E&M-EST. PATIENT-LVL III: CPT | Mod: PBBFAC,,, | Performed by: PSYCHIATRY & NEUROLOGY

## 2017-12-28 RX ORDER — PRAZOSIN HYDROCHLORIDE 2 MG/1
2 CAPSULE ORAL NIGHTLY
Qty: 30 CAPSULE | Refills: 2 | Status: SHIPPED | OUTPATIENT
Start: 2017-12-28 | End: 2018-06-15

## 2017-12-28 RX ORDER — ALPRAZOLAM 0.5 MG/1
0.25 TABLET ORAL 2 TIMES DAILY PRN
Qty: 30 TABLET | Refills: 2 | Status: SHIPPED | OUTPATIENT
Start: 2017-12-28 | End: 2018-03-23 | Stop reason: SDUPTHER

## 2017-12-28 RX ORDER — DULOXETIN HYDROCHLORIDE 30 MG/1
90 CAPSULE, DELAYED RELEASE ORAL DAILY
Qty: 270 CAPSULE | Refills: 1 | Status: SHIPPED | OUTPATIENT
Start: 2017-12-28 | End: 2018-03-09 | Stop reason: SDUPTHER

## 2017-12-28 RX ORDER — BUPROPION HYDROCHLORIDE 300 MG/1
300 TABLET ORAL DAILY
Qty: 90 TABLET | Refills: 1 | Status: SHIPPED | OUTPATIENT
Start: 2017-12-28 | End: 2018-03-23 | Stop reason: SDUPTHER

## 2017-12-28 NOTE — PROGRESS NOTES
"12/28/2017  8:38 AM  Cierra Irby  [id number]          Psychiatry Clinic Note    SUBJECTIVE  Cierra Irby is a 37 y.o. old female who presents to clinic today for follow up of PTSD, MDD, and BHAVIN.  She reports that since the last visit she has continued to struggle with symptoms of anxiety, depression (poor mood, sluggishness, difficulty feeling comfortable in bed leading to decreased sleep, difficulty concentrating) and PTSD symptoms (flashbacks, hypervigilance, emotional isolation) that interfere with her day to day life and exacerbate underlying depression, anxiety.  Recently, she forgot to do an self-evaluation at work because she felt overwhelmed by the questionaire, which is very unlike her.  She also feels continued relationship struggles with her significant other, however they are starting couples counseling with Cristopher Tai next month.  She does not know if the medications are adequately working for her at present, but is hesitant about making changes to her current regimen.  Discussed prolonged exposure therapy at length with patient and gave her several resources ([facility], [facility] and [email or web address]) that could be of additional help to her, as most of her symptoms seem either worsened or at least connected to her trauma history.  Pt agrees and plans to seek out these therapists in January.  Did discuss increasing her Prazosin nightly to help with some of her symptoms of fear related to going to bed at night and feeling comfortable in bed with her significant other.      PSYCHIATRIC ROS  Denies: delusions, paranoia,  periods of persistently elevated/expansive or irritable mood and/or increased goal directed behavior.    Issues/problems with:   Sleep yes - associated with "irrational fear" related to previous trauma  Appetite changes no  Low energy yes  Poor concentration yes  Psychomotor agitation no  Suicidal ideation no  Depressed mood yes  Anhedonia no  Anxiety yes  Worry " yes  Fear yes - fear associated with intimacy/men related to previous trauma        CURRENT HOME PSYCHIATRIC MEDICATIONS:  Cymbalta 90mg daily (3 tablets of 30mg) for depression  Wellbutrin XL 300mg daily for depression  Xanax 0.25mg - 0.5mg PRN daily for panic attacks  Prazosin 1mg nightly for PTSD symptoms    Patient reports that She is tolerating medications as prescribed without any adverse side effects, but does not feel that her symptoms are adequately controlled    OTHER HOME MEDICATIONS    MEDICAL ROS  General ROS: positive for  - headaches  ENT ROS: negative  Cardiovascular ROS: no chest pain or dyspnea on exertion  Respiratory ROS: no cough, shortness of breath, or wheezing  Gastrointestinal ROS: no abdominal pain, change in bowel habits, or black or bloody stools  Neurological ROS: no TIA or stroke symptoms  Dermatological ROS: negative  Endocrine ROS: negative      OBJECTIVE    Vitals:    12/28/17 0819   BP: (!) 105/59   Pulse: 81       MENTAL STATUS EXAM  Appearance: normal, no acute distress, appropriate attire  Behavior: calm, cooperative  Speech: normal tone, volume, prosody  Thought process: linear, goal directed  Thought content: denies SI/HI/AVH, paranoid or delusional thought content  Mood: euthymic  Affect: mood congruent, appropriate  Cognition: grossly intact  Insight: good  Judgment: appropriate for setting    THERAPY    STATUS/PROGRESS Based on the examination today, the patient's problem(s) is/are worsening. New problems have not presented today. Co-morbidities are not complicating management of the primary condition. There are not active rule-out diagnoses for this patient at this time.       ASSESSMENT AND PLAN  1) PTSD, chronic  2) MDD (present before trauma)  3) Generalized Anxiety Disorder with Panic attacks (also present before trauma)    Continue Cymbalta 90mg daily (will provide 90 day supply per patient's insurance and personal preference with 1 refill) for depression, anxiety  symptoms.  Continue Wellbutrin XL 300mg daily for depression (also will provide 90 day supply).  Continue Xanax 0.25-0.5mg daily PRN for panic attacks (can break 0.5mg tablet in half); 30 day supply, 2 refills.  Will increase Prazosin from 1mg nightly to 2mg for PTSD related symptoms.  Discussed seeking out trauma related CBT or Prolonged Exposure therapy as patient's PTSD are the most limiting at present; she is also participating in [place], or trauma related group therapy, but urged her to consider individual PE to further address her symptoms.  Pt and her boyfriend are also scheduled to see Cristopher Wilkinsdyan for couples therapy in January as well.  Pt provided resident with Consent from [facility] to release information to and from the two facilities for continuity of care.  Will scan into patient's chart.          Discussed diagnosis, risks and benefits of proposed treatment above vs alternative treatments vs no treatment, and potential side effects of these treatments. The patient expresses understanding of the above and displays the capacity to agree with this treatment given said understanding. Patient also agrees that, currently, the benefits outweigh the risks and would like to pursue treatment at this time.     Return to clinic 3 months    Total Time: 30 minutes    More than 50% of the time was spent on counseling and coordination of care.    Psychoeducation and medication management counseling and coordination of care.      Teetee Bolivar D.O.  [facility]  [phone]    12/28/2017  8:38 AM

## 2018-01-03 ENCOUNTER — TELEPHONE (OUTPATIENT)
Dept: NEUROLOGY | Facility: CLINIC | Age: 38
End: 2018-01-03

## 2018-01-03 NOTE — TELEPHONE ENCOUNTER
----- Message from Sorin Grullon sent at 1/3/2018  4:06 PM CST -----  Contact: Self @ [phone]  Pt is calling back to reschedule botox appt on 01/18/18. Pt is asking for a call back for the clinic closes today. Pls call.

## 2018-01-11 ENCOUNTER — OFFICE VISIT (OUTPATIENT)
Dept: PSYCHIATRY | Facility: CLINIC | Age: 38
End: 2018-01-11
Payer: COMMERCIAL

## 2018-01-11 DIAGNOSIS — F43.10 PTSD (POST-TRAUMATIC STRESS DISORDER): Primary | ICD-10-CM

## 2018-01-11 DIAGNOSIS — F33.0 MILD EPISODE OF RECURRENT MAJOR DEPRESSIVE DISORDER: ICD-10-CM

## 2018-01-11 DIAGNOSIS — F41.1 GAD (GENERALIZED ANXIETY DISORDER): ICD-10-CM

## 2018-01-11 PROCEDURE — 90847 FAMILY PSYTX W/PT 50 MIN: CPT | Mod: S$GLB,,, | Performed by: SOCIAL WORKER

## 2018-01-12 ENCOUNTER — OFFICE VISIT (OUTPATIENT)
Dept: URGENT CARE | Facility: CLINIC | Age: 38
End: 2018-01-12
Payer: COMMERCIAL

## 2018-01-12 VITALS
HEART RATE: 75 BPM | OXYGEN SATURATION: 99 % | TEMPERATURE: 98 F | SYSTOLIC BLOOD PRESSURE: 131 MMHG | HEIGHT: 68 IN | BODY MASS INDEX: 21.98 KG/M2 | RESPIRATION RATE: 19 BRPM | DIASTOLIC BLOOD PRESSURE: 82 MMHG | WEIGHT: 145 LBS

## 2018-01-12 DIAGNOSIS — R53.83 FATIGUE, UNSPECIFIED TYPE: ICD-10-CM

## 2018-01-12 DIAGNOSIS — H61.23 BILATERAL IMPACTED CERUMEN: ICD-10-CM

## 2018-01-12 DIAGNOSIS — Z76.89 ESTABLISHING CARE WITH NEW DOCTOR, ENCOUNTER FOR: Primary | ICD-10-CM

## 2018-01-12 PROCEDURE — 69209 REMOVE IMPACTED EAR WAX UNI: CPT | Mod: 50,S$GLB,, | Performed by: EMERGENCY MEDICINE

## 2018-01-12 PROCEDURE — 99214 OFFICE O/P EST MOD 30 MIN: CPT | Mod: 25,S$GLB,, | Performed by: EMERGENCY MEDICINE

## 2018-01-12 NOTE — PATIENT INSTRUCTIONS
Earwax (Treated)    Everyone produces earwax from the lining of the ear canal. It lubricates and protects the ear. The wax that forms in the canal slowly moves toward the outside of the ear and falls out. Sometimes wax can build up in the ear canal. This can cause a blockage and loss of hearing. A buildup of earwax was removed from your ear today.  Home care  If you have a tendency to build up wax in the ear canal, you should clear the wax at home regularly, before it causes discomfort. This should be about once every six months.  · Unless a medicine was prescribed, you may use an over-the-counter product made for clearing earwax. These contain carbamide peroxide and are available over-the-counter in a kit with a small bulb syringe.  · Lie down with the blocked ear facing upward. Apply one dropper full of medicine and wait a few minutes. Grasp the outer ear and wiggle it to help the solution enter the canal.  · Lean over a sink or basin with the blocked ear turned downward. Use a rubber bulb syringe filled with warm (not hot or cold) water to rinse the ear several times. Use gentle pressure only. You may need to repeat the irrigation several times before the wax flows out.  · If you are having trouble draining all the water out of your ear canal, put a few drops of rubbing alcohol into the ear canal. This will help remove the remaining water.  Don'ts  · Dont use cold water to rinse the ear. This will make you dizzy.  · Dont do this procedure if you have an ear infection. Symptoms include ear pain, fever, or fluid draining from the ear.  · Dont do this procedure if you have a punctured eardrum.  · Dont use cotton swabs, matches, hairpins, keys, or other objects to clean the ear canal. This can cause infection of the ear canal or rupture of the eardrum. Because of their size and shape, cotton swabs can push the earwax deeper into the ear canal instead of removing it.  Follow-up care  Follow up with your  healthcare provider, or as advised.  When to seek medical advice  Call your healthcare provider right away if any of these occur:  · Worsening ear pain  · Fever of 100.4°F (38°C) or higher, or as directed by your healthcare provider  · Hearing does not return to normal after three days of treatment  · Fluid drainage or bleeding from the ear canal  · Swelling, redness, or tenderness of the outer ear  · Headache, neck pain, or stiff neck  Date Last Reviewed: 3/22/2015  © 2518-4510 [employer]. [address] [place] [id number]. All rights reserved. This information is not intended as a substitute for professional medical care. Always follow your healthcare professional's instructions.        Managing Fatigue     Family members can help with meals and chores around the house.   Fatigue is common. It can be caused by worry, lack of sleep, or poor appetite. Fatigue can also be a sign of anemia, a shortage of red blood cells. You might need medical treatment for anemia. The tips below can help you feel better.  Conserving energy  · Keep track of the times of day when you are most tired and plan around them. For instance, if you are more tired in the afternoon, try to get tasks done in the morning.  · Decide which tasks are most important. Do those first.  · Pass tasks along to others when you need to. Ask for help.  · Accept help when its offered. Tell people what they can do to help. For instance, you may need someone to fix a meal, fold clothes, or put gas in your car.  · Plan rest times. You may want to take a nap each day. Just sitting quietly for a few minutes can make you feel more rested.  What you can do to feel better  · Relax before you try to sleep. Take a bath or read for a while.  · Form a sleep pattern. Go to bed at the same time each night and get up at the same time each morning.  · Eat well. Choose foods from all of the food groups each day.  · Exercise. Take a brisk walk to help  increase your energy.  · Avoid caffeine and alcohol. Drink plenty of water or fruit juices instead.  Treating anemia  If you begin to feel more tired than normal, tell your doctor. Fatigue could be a sign of anemia. This problem is fairly common in cancer patients, especially during chemotherapy and radiation treatments. If your red blood cell count is too low, you may get a blood transfusion. In some cases, you may need medicine to increase the number of red blood cells your body makes.  When to call your healthcare provider  Call your healthcare provider if you have:  · Shortness of breath or chest pain  · A dizzy feeling when you get up from lying or sitting down  · Paler skin than normal  · Extreme tiredness that is not helped by sleep   Date Last Reviewed: 1/3/2016  © 2901-9619 [employer]. [address] [place] [address], [place] [address]. All rights reserved. This information is not intended as a substitute for professional medical care. Always follow your healthcare professional's instructions.      I have given you an [facility] doctor referral. If you don't hear from them in a few days call [phone]
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
## 2018-01-12 NOTE — PROGRESS NOTES
"Subjective:       Patient ID: Cierra Irby is a 37 y.o. female.    Vitals:  height is 5' 8" (1.727 m) and weight is 65.8 kg (145 lb). Her oral temperature is 98.1 °F (36.7 °C). Her blood pressure is 131/82 and her pulse is 75. Her respiration is 19 and oxygen saturation is 99%.     Chief Complaint: Sore Throat    Patient with intermittent sore throat x 4 days. Patient stating she had chills and sweats on Monday night. Patient reports increased fatigue. Patient reports her sxs have gotten better but she is still extremely tired. She is feeling much better but is traveling next week and wants to know what she has.  She also has a kit she got that sends off testing for lyme disease and she asked if we could draw her blood. She has the tubes and will send it off herself. She had called infectious disease and they said they would only see her if the test was positive    Pt lived in [place] and got bit a lot by tics. She has a lot of medical issues that no one has ever been able to diagnose. That is why she wants to be tested for lyme disease and she has the kit. She just moved here and needs a PCP.      Sore Throat    This is a new problem. The current episode started in the past 7 days. Associated symptoms include congestion (minimal and gone now) and headaches. Pertinent negatives include no abdominal pain, coughing, ear pain, hoarse voice or shortness of breath. She has tried acetaminophen for the symptoms. The treatment provided moderate relief.     Review of Systems   Constitution: Positive for chills, malaise/fatigue and night sweats. Negative for fever.   HENT: Positive for congestion (minimal and gone now) and sore throat (better and minimal now). Negative for ear pain and hoarse voice.         Sinus drainage     Eyes: Negative for discharge and redness.   Cardiovascular: Negative for chest pain, dyspnea on exertion and leg swelling.   Respiratory: Negative for cough, shortness of breath, sputum production " and wheezing.    Musculoskeletal: Negative for myalgias.   Gastrointestinal: Positive for nausea. Negative for abdominal pain.   Neurological: Positive for headaches.       Objective:      Physical Exam   Constitutional: She is oriented to person, place, and time. She appears well-developed and well-nourished. She is cooperative.  Non-toxic appearance. She does not appear ill. No distress.   HENT:   Head: Normocephalic and atraumatic.   Right Ear: Hearing and external ear normal.   Left Ear: Hearing and external ear normal.   Nose: Nose normal. No mucosal edema, rhinorrhea or nasal deformity. No epistaxis. Right sinus exhibits no maxillary sinus tenderness and no frontal sinus tenderness. Left sinus exhibits no maxillary sinus tenderness and no frontal sinus tenderness.   Mouth/Throat: Uvula is midline, oropharynx is clear and moist and mucous membranes are normal. No trismus in the jaw. Normal dentition. No uvula swelling. No posterior oropharyngeal erythema.   Bilateral ceruminosis and TMS not seen initially   Eyes: Conjunctivae, EOM and lids are normal. Pupils are equal, round, and reactive to light. No scleral icterus.   Sclera clear bilat   Neck: Trachea normal, normal range of motion, full passive range of motion without pain and phonation normal. Neck supple.   Cardiovascular: Normal rate, regular rhythm, normal heart sounds, intact distal pulses and normal pulses.    Pulmonary/Chest: Effort normal and breath sounds normal. No respiratory distress.   Abdominal: Soft. Normal appearance and bowel sounds are normal. She exhibits no distension. There is no tenderness.   Musculoskeletal: Normal range of motion. She exhibits no edema or deformity.   Neurological: She is alert and oriented to person, place, and time. She exhibits normal muscle tone. Coordination normal.   Skin: Skin is warm, dry and intact. She is not diaphoretic. No pallor.   Psychiatric: She has a normal mood and affect. Her speech is normal and  behavior is normal. Judgment and thought content normal. Cognition and memory are normal.   Nursing note and vitals reviewed.      Assessment:       1. Establishing care with new doctor, encounter for    2. Bilateral impacted cerumen    3. Fatigue, unspecified type        Plan:         Establishing care with new doctor, encounter for  -     Ambulatory referral to Internal Medicine    Bilateral impacted cerumen  -     EAR CERUMEN REMOVAL    Fatigue, unspecified type

## 2018-01-12 NOTE — PROCEDURES
Ear Cerumen Removal  Date/Time: 1/12/2018 2:39 PM  Performed by: STEPHANIE PIERRE  Authorized by: STEPHANIE PIERRE       Local anesthetic:  None  Location details:  Both ears  Procedure type: irrigation    Cerumen  Removal Results:  Cerumen completely removed  Patient tolerance:  Patient tolerated the procedure well with no immediate complications     TMS and canal clear after irrigation

## 2018-01-19 NOTE — PROGRESS NOTES
This encounter was reviewed by me and case was discussed with the resident physician. I agree with the assessment and  treatment plan as stated.    Arielle Braxton MD

## 2018-01-22 ENCOUNTER — PATIENT MESSAGE (OUTPATIENT)
Dept: PSYCHIATRY | Facility: CLINIC | Age: 38
End: 2018-01-22

## 2018-02-02 ENCOUNTER — PROCEDURE VISIT (OUTPATIENT)
Dept: NEUROLOGY | Facility: CLINIC | Age: 38
End: 2018-02-02
Payer: COMMERCIAL

## 2018-02-02 VITALS
HEIGHT: 68 IN | WEIGHT: 152.13 LBS | SYSTOLIC BLOOD PRESSURE: 114 MMHG | BODY MASS INDEX: 23.05 KG/M2 | HEART RATE: 93 BPM | DIASTOLIC BLOOD PRESSURE: 74 MMHG

## 2018-02-02 DIAGNOSIS — N20.0 KIDNEY STONES: ICD-10-CM

## 2018-02-02 DIAGNOSIS — F33.41 MDD (MAJOR DEPRESSIVE DISORDER), RECURRENT, IN PARTIAL REMISSION: ICD-10-CM

## 2018-02-02 DIAGNOSIS — F41.1 GAD (GENERALIZED ANXIETY DISORDER): ICD-10-CM

## 2018-02-02 PROCEDURE — 99213 OFFICE O/P EST LOW 20 MIN: CPT | Mod: 25,S$GLB,, | Performed by: NURSE PRACTITIONER

## 2018-02-02 PROCEDURE — 64615 CHEMODENERV MUSC MIGRAINE: CPT | Mod: S$GLB,,, | Performed by: NURSE PRACTITIONER

## 2018-02-02 RX ORDER — ONDANSETRON 4 MG/1
4 TABLET, ORALLY DISINTEGRATING ORAL EVERY 6 HOURS PRN
Qty: 20 TABLET | Refills: 5 | Status: SHIPPED | OUTPATIENT
Start: 2018-02-02 | End: 2018-04-30 | Stop reason: SDUPTHER

## 2018-02-02 RX ORDER — KETOROLAC TROMETHAMINE 15.75 MG/1
15.75 SPRAY, METERED NASAL EVERY 6 HOURS
Qty: 5 EACH | Refills: 5 | Status: SHIPPED | OUTPATIENT
Start: 2018-02-02 | End: 2018-10-12

## 2018-02-02 NOTE — PROCEDURES
"SUBJECTIVE:  Patient ID: Cierra Irby  Chief Complaint: Headache and Follow-up    History of Present Illness:  37 y.o. female with chronic migraine, kidney stones, MDD, anxiety, trouble sleeping, and panic attacks, who presents to clinic alone for follow-up of headaches and repeat Botox injections.     Interval History:  Since Botox injections started on 10/19/2017, she has noticed the frequency of her migraines is going down, thinks she went a whole month without a migraine.  But states she has noticed her headaches have been increasing in frequency over the last week.  She has established care with mental health and is regularly seen in clinic, Prozac was discontinued and she was started on Cymbalta 90 mg to supplement Wellbutrin as well as Prazosin nightly for anxiety.  She also attended the 8 week mindfulness class which she also found to be helpful for her anxiety and learning how to cope with the pain of her migraines.  She did not get Cefaly device.  States she does not have any medication to take when she gets a migraine so she just has to wait until it passes.  Overall, she feels the Botox is helping her migraines and would like to repeat the injections.     Initial GUTIERRES HPI:  37 y.o. female with history of migraines, anxiety, and vertigo, presents to clinic alone for evaluation of her headaches.  Headaches initially began when she was in , but then this went away until 2002, when her headaches returned.  Recently moved here from [place], previous patient of [facility] headache center.  She is currently doing Botox, started in May, 3rd round of Botox due in October.  Botox was doing well for treatment, but with the stress of her move to [place], she feels things have kind of gotten mixed up.  She states "this has been one of the worst years I have had, it hasn't been this bad since 2009."  Got very sick in February and had infusion treatments done. Migraines are always right sided, in her " forehead, temple, but can be occipitally located.  Pain is described as throbbing, feels like she can feel her veins pulsing.  Onset can be over 30 minutes, but can be longer like hours, depending on what type of headaches she is having.  When she gets a headache, she tries not to take anything, just to prevent the bounce backs. In the past she has taken Hydrocodone and other pain medicatinos. States Hydrocodone works the most, but she tries not to take it as it makes her feel itchy.  Headaches are at least 5x/week, gets a full blown migraine about 1-2 times per week.  Migraines last at least 24 hours, but can be up to 3 days.  Since beginning the Botox, they have not been lasting 3 days.  Before she started the Botox, she could not get out of the cycle of her headache.  In the past she has experienced an aura, but does not typically experience an aura any longer.  Headaches are typically worse from August to March, and are worsened with movement.  Pain can be anywhere from 3-10 out of 10.  She is currently able to bear children and is not taking oral contraceptive pills, however she is not trying to get pregnant and her and her boyfriend do use protection.  Associated Symptoms - nausea, vertigo, photo/phonophobia, cannot think, vomit, neck pain  Triggers - stress, rain, flying, lemon, detergent smells, strong perfume smells, rollercoaster   Aggravating Factors - moving, looking up and down, any noise/smells/lights   Alleviating Factors - an ice cap, peppermint oil helps with nausea, laying down, eating helps   Head Trauma? Infection? Fever? Cancer? Pregnancy? <-- had 3 minor concussions and then another 4 years ago   Recent Changes - Recently moved from [place] to [place] (for boyfriend),   Sleep - does better when she takes the Zanaflex, does not feel rested in the morning, sleeps on average 6-7 hours per night   Family Hx of Migraines (mom)  Positives in bold: Hx of Kidney Stones, asthma, GI bleed (stomach  ulcers in the past), osteoporosis, CAD/MI, CVA/TIA, DM      Treatments Tried and Response  Imitrex - no help, made her feel worse  Maxalt - no  Zomig PO - no  Sprix - kind of worked  Cambia - no   Topamax - gave her many kidney stones   Lithium - was given for her migraine   Wellbutrin - for anxiety  Lexapro - for anxiety   Verapamil - no help   Depakote - no   Magnesium - has to look at dose   Zanaflex - +/-  Zomig NS - helps   Botox - helping   Sprix - retried today   Cymbalta - for depression, may be helping with headaches as well     Current Medications:    buPROPion (WELLBUTRIN XL) 300 MG 24 hr tablet, Take 1 tablet (300 mg total) by mouth once daily., Disp: 90 tablet, Rfl: 1    DULoxetine (CYMBALTA) 30 MG capsule, Take 3 capsules (90 mg total) by mouth once daily. Take 3 tablets of 30mg total to equal 90mg daily, Disp: 270 capsule, Rfl: 1    FLUVIRIN 5949-1548, PF, 45 mcg (15 mcg x 3)/0.5 mL Syrg, ADM 0.5ML IM UTD, Disp: , Rfl: 0    meclizine (ANTIVERT) 25 mg tablet, Take 25 mg by mouth 3 (three) times daily as needed., Disp: , Rfl:     prazosin (MINIPRESS) 2 MG Cap, Take 1 capsule (2 mg total) by mouth every evening., Disp: 30 capsule, Rfl: 2    tizanidine (ZANAFLEX) 4 MG tablet, TK 1/2 TO 1 T PO QD HS PRF MUSCLE SPASM, Disp: , Rfl: 0    UNABLE TO FIND, Mindfulness-Based Stress Reduction class to help with stress reduction and migraine prevention., Disp: 1 Units, Rfl: 5    UNABLE TO FIND, Cefaly + Electrodes. Wear 20-30 minutes daily.  Dx: Migraines. [email or web address], Disp: 1 Device, Rfl: 2    ALPRAZolam (XANAX) 0.5 MG tablet, Take 0.5 tablets (0.25 mg total) by mouth 2 (two) times daily as needed for Anxiety., Disp: 30 tablet, Rfl: 2    ketorolac 15.75 mg/spray Spry, 15.75 mg by Nasal route every 6 (six) hours., Disp: 5 each, Rfl: 5    ondansetron (ZOFRAN-ODT) 4 MG TbDL, Take 1 tablet (4 mg total) by mouth every 6 (six) hours as needed., Disp: 20 tablet, Rfl: 5    Current Facility-Administered  "Medications:     onabotulinumtoxina injection 200 Units, 200 Units, Intramuscular, Q90 Days, Ellen Bueno, FNP, 200 Units at 10/19/17 1143    Review of Systems - as per HPI, otherwise a balanced 10 systems review is negative.    OBJECTIVE:  Vitals:  /74 (BP Location: Right arm, Patient Position: Sitting, BP Method: Large (Automatic))   Pulse 93   Ht 5' 8" (1.727 m)   Wt 69 kg (152 lb 1.9 oz)   LMP 01/12/2018 (Exact Date)   BMI 23.13 kg/m²     Physical Exam:  Constitutional: she appears well-developed and well-nourished. she is well groomed. NAD   HENT:    Head: Normocephalic and atraumatic  Eyes: Conjunctivae and EOM are normal  Musculoskeletal: Normal range of motion. No joint stiffness.   Skin: Skin is warm and dry.  Psychiatric: Mood and affect are normal    Neuro: Patient is awake, alert, and oriented to person, place, and time. Language is intact and fluent.  Recent and remote memory are intact.  Normal attention and concentration.  Facial movement is symmetric.  Gait is normal.     Review of Data:   Notes from psychiatry and urgent care reviewed   Labs:  No visits with results within 3 Month(s) from this visit.   Latest known visit with results is:   Office Visit on 09/05/2017   Component Date Value Ref Range Status    POC Blood, Urine 09/05/2017 Negative  Negative Final    POC Bilirubin, Urine 09/05/2017 Negative  Negative Final    POC Urobilinogen, Urine 09/05/2017 1.0  0.1 - 1.1 Final    POC Ketones, Urine 09/05/2017 Negative  Negative Final    POC Protein, Urine 09/05/2017 Negative  Negative Final    POC Nitrates, Urine 09/05/2017 Positive* Negative Final    POC Glucose, Urine 09/05/2017 Negative  Negative Final    pH, UA 09/05/2017 5.0  5 - 8 Final    POC Specific Gravity, Urine 09/05/2017 1.020  1.003 - 1.029 Final    POC Leukocytes, Urine 09/05/2017 Negative  Negative Final    POC Preg Test, Ur 09/05/2017 Negative  Negative Final     Acceptable 09/05/2017 Yes "   Final    Urine Culture, Routine 09/05/2017 Final report   Final    Result 1 09/05/2017 No growth   Final     Note: I have independently reviewed any/all imaging/labs/tests and agree with the report (s) as documented.  Any discrepancies will be as noted/demarcated by free text.  SHEFALI HILLMAN 2/2/2018    ASSESSMENT:  1. Chronic migraine    2. MDD (major depressive disorder), recurrent, in partial remission    3. BHAVIN (generalized anxiety disorder)    4. Kidney stones      PLAN:  - Botox administered in clinic for Chronic Migraine (see below)   - Continue Cymbalta daily - for depression, but may provide some help with Migraines  - May consider starting Propranolol vs Lamictal at next visit if further preventive therapy is needed, must avoid topiramate and zonisamide due to hx of kidney stones    - Recommend she order Cefaly device   - Continue Zanaflex 4 mg nightly   - For migraine abortive - given Rx of Sprix nasal spray   - For nausea - refilled Zofran ODT   - Urged her to restart tracking her headaches  - Continue regular follow-up with mental health   0 urged her to start tracking headaches   - RTC in 3 months for repeat Botox injections or sooner if needed     Orders Placed This Encounter    ketorolac 15.75 mg/spray Spry    ondansetron (ZOFRAN-ODT) 4 MG TbDL     All questions and concerns addressed.  Patient verbalizes understanding and is agreeable with the above stated treatment plan.      PROCEDURE NOTE:  BOTOX was performed as an indicated therapy for intractable chronic migraine headaches given that the patient failed more than 2 headache medications    Two patient identifiers were confirmed with the patient prior to performing this procedure. A time out to determine correct patient and and agreement on procedure performed was conducted prior to the procedure.      Botulinum Toxin Injection Procedure   Procedure: Chemical neurolysis   After risks and benefits were explained including bleeding, infection,  worsening of pain, damage to the areas being injected, weakness of muscles, loss of muscle control, dysphagia if injecting the head or neck, facial droop if injecting the facial area, painful injection, allergic or other reaction to the medications being injected, and the failure of the procedure to help the problem, a signed consent was obtained.   The patient was placed in a comfortable area and the sites to be treated were identified.The area to be treated was prepped three times with alcohol and the alcohol allowed to dry. Next, a 30 gauge needle was used to inject the medication in the area to be treated.      Total Botox used: 125 Units   Botox wastage: 75 Units     Injection sites:    muscle bilaterally ( a total of 10 units divided into 2 sites)   Procerus muscle (5 units)   Frontalis muscle bilaterally (a total of 20 units divided into 4 sites)   Temporalis muscle bilaterally (a total of 40 units divided into 8 sites)   Occipitalis muscle bilaterally (a total of 30 units divided into 6 sites)   Cervical paraspinal muscles (a total of 20 units divided into 4 sites)   Complications: none   RTC for the next Botox injection: 3 months        KIRA Crews-C [facility] Department of Neurology   [phone]

## 2018-02-16 ENCOUNTER — PATIENT MESSAGE (OUTPATIENT)
Dept: NEUROLOGY | Facility: CLINIC | Age: 38
End: 2018-02-16

## 2018-02-16 RX ORDER — PREDNISONE 20 MG/1
TABLET ORAL
Qty: 12 TABLET | Refills: 0 | Status: SHIPPED | OUTPATIENT
Start: 2018-02-16 | End: 2018-03-14

## 2018-02-23 ENCOUNTER — PATIENT MESSAGE (OUTPATIENT)
Dept: NEUROLOGY | Facility: CLINIC | Age: 38
End: 2018-02-23

## 2018-02-23 DIAGNOSIS — G43.719 INTRACTABLE CHRONIC MIGRAINE WITHOUT AURA AND WITHOUT STATUS MIGRAINOSUS: Primary | ICD-10-CM

## 2018-02-23 RX ORDER — HYDROXYZINE PAMOATE 50 MG/1
CAPSULE ORAL
Qty: 15 CAPSULE | Refills: 0 | Status: SHIPPED | OUTPATIENT
Start: 2018-02-23 | End: 2018-06-15

## 2018-02-26 ENCOUNTER — CLINICAL SUPPORT (OUTPATIENT)
Dept: NEUROLOGY | Facility: CLINIC | Age: 38
End: 2018-02-26
Payer: COMMERCIAL

## 2018-02-26 ENCOUNTER — PATIENT MESSAGE (OUTPATIENT)
Dept: NEUROLOGY | Facility: CLINIC | Age: 38
End: 2018-02-26

## 2018-02-26 DIAGNOSIS — G43.511 MIGRAINE AURA, PERSISTENT, INTRACTABLE, WITH STATUS MIGRAINOSUS: Primary | ICD-10-CM

## 2018-02-26 PROCEDURE — 96372 THER/PROPH/DIAG INJ SC/IM: CPT | Mod: S$GLB,,, | Performed by: NURSE PRACTITIONER

## 2018-02-26 RX ORDER — KETOROLAC TROMETHAMINE 30 MG/ML
60 INJECTION, SOLUTION INTRAMUSCULAR; INTRAVENOUS
Status: COMPLETED | OUTPATIENT
Start: 2018-02-26 | End: 2018-02-26

## 2018-02-26 RX ORDER — METHYLPREDNISOLONE ACETATE 80 MG/ML
80 INJECTION, SUSPENSION INTRA-ARTICULAR; INTRALESIONAL; INTRAMUSCULAR; SOFT TISSUE
Status: COMPLETED | OUTPATIENT
Start: 2018-02-26 | End: 2018-02-26

## 2018-02-26 RX ADMIN — METHYLPREDNISOLONE ACETATE 80 MG: 80 INJECTION, SUSPENSION INTRA-ARTICULAR; INTRALESIONAL; INTRAMUSCULAR; SOFT TISSUE at 02:02

## 2018-02-26 RX ADMIN — KETOROLAC TROMETHAMINE 60 MG: 30 INJECTION, SOLUTION INTRAMUSCULAR; INTRAVENOUS at 02:02

## 2018-02-26 NOTE — NURSING
Patient seen in clinic to have injections per order of Ellen Bueno.Depro-Medrol given in right outer gluteus.Toradol 60 mg given in right outer gluteus.Band aides applied.Sites clear.2 Patient identifiers and allergies reviewed.Patient 7 on scale.Shortly after meds given Patient stated she felt faint.Lights dimmed in room,water given,instructed Patient to take deep breaths.Notified Mary Bueno.Patient then stated that she feels the same way with Botox.Mary in room-Patient stable.She rated pain as a 6.She was in room with Nursing present for 20 minutes.Patient states she will call Uber to take her home.

## 2018-03-09 ENCOUNTER — PATIENT MESSAGE (OUTPATIENT)
Dept: PSYCHIATRY | Facility: CLINIC | Age: 38
End: 2018-03-09

## 2018-03-09 DIAGNOSIS — F33.41 MDD (MAJOR DEPRESSIVE DISORDER), RECURRENT, IN PARTIAL REMISSION: ICD-10-CM

## 2018-03-09 DIAGNOSIS — F41.1 GAD (GENERALIZED ANXIETY DISORDER): ICD-10-CM

## 2018-03-09 DIAGNOSIS — F43.10 PTSD (POST-TRAUMATIC STRESS DISORDER): ICD-10-CM

## 2018-03-09 RX ORDER — DULOXETIN HYDROCHLORIDE 30 MG/1
90 CAPSULE, DELAYED RELEASE ORAL DAILY
Qty: 270 CAPSULE | Refills: 1 | Status: SHIPPED | OUTPATIENT
Start: 2018-03-09 | End: 2018-06-15 | Stop reason: SDUPTHER

## 2018-03-09 NOTE — TELEPHONE ENCOUNTER
Pt requesting refill of Cymbalta.  Will send updated prescription to pharmacy on file, Cymbalta 90mg (three tablets of 30mg) daily for depression/PTSD.  Messaged patient to indicate as such as well.    Teetee Bolivar D.O.  -[facility]  [phone]    3/9/2018  9:14 AM

## 2018-03-14 ENCOUNTER — OFFICE VISIT (OUTPATIENT)
Dept: INTERNAL MEDICINE | Facility: CLINIC | Age: 38
End: 2018-03-14
Payer: COMMERCIAL

## 2018-03-14 VITALS
BODY MASS INDEX: 22.28 KG/M2 | DIASTOLIC BLOOD PRESSURE: 89 MMHG | HEIGHT: 68 IN | SYSTOLIC BLOOD PRESSURE: 116 MMHG | OXYGEN SATURATION: 97 % | WEIGHT: 147 LBS | HEART RATE: 78 BPM

## 2018-03-14 DIAGNOSIS — F41.1 GAD (GENERALIZED ANXIETY DISORDER): ICD-10-CM

## 2018-03-14 DIAGNOSIS — K59.00 CONSTIPATION, UNSPECIFIED CONSTIPATION TYPE: ICD-10-CM

## 2018-03-14 DIAGNOSIS — Z00.00 HEALTH CARE MAINTENANCE: Primary | ICD-10-CM

## 2018-03-14 DIAGNOSIS — F33.41 MDD (MAJOR DEPRESSIVE DISORDER), RECURRENT, IN PARTIAL REMISSION: ICD-10-CM

## 2018-03-14 DIAGNOSIS — R42 DIZZINESS: ICD-10-CM

## 2018-03-14 DIAGNOSIS — F43.10 PTSD (POST-TRAUMATIC STRESS DISORDER): ICD-10-CM

## 2018-03-14 DIAGNOSIS — R53.83 FATIGUE, UNSPECIFIED TYPE: ICD-10-CM

## 2018-03-14 PROCEDURE — 99395 PREV VISIT EST AGE 18-39: CPT | Mod: S$GLB,,, | Performed by: INTERNAL MEDICINE

## 2018-03-14 PROCEDURE — 99999 PR PBB SHADOW E&M-EST. PATIENT-LVL III: CPT | Mod: PBBFAC,,, | Performed by: INTERNAL MEDICINE

## 2018-03-14 NOTE — PROGRESS NOTES
"Subjective:       Patient ID: Cierra Irby is a 38 y.o. female.    Chief Complaint: establish care. concerned re: potential Lyme Disease (pt complains of having chronic symptoms like extreme fatigue, difficulty with continual infections.) and GI Problem (pt complains of symptoms like constipation, diarrhea. pt takes Colace but doesn't help much.)    HPI   Cierra Irby is a 38 y.o. female here to establish care and have yearly preventative healthcare visit.     Hx MDD, BHAVIN, and PTSD followed by psychiatry.   Panic attacks  Xanax about once per week.    Hx migraines followed by neurology.     Digestive issues with chronic nausea, constipation. Constipation lasting weeks at a time unless takes medication.  Has had colonoscopies which were normal. Reported IBS or stress.     Intermittent urinary burning.   She had a cystoscope which was unrevealing. STD test unrevealing.    Dizziness. Vertigo also which seems to go with migraines.  Dizziness with exercise.     Some days very lethargic.   To point she can't hold her head up.   Every 5-6 days.   Has to take medication to sleep.   Grinds her teeth at night.   Sleep study without sleep apnea.     Has been going on for a few years.     Has had many tick bites in her life. Grew up in [place]. Travels for work. Never lived on [facility].   Review of Systems   Constitutional: Negative for fever.   HENT: Negative.    Eyes: Negative.    Respiratory: Negative for shortness of breath.    Cardiovascular: Negative for chest pain and leg swelling.   Gastrointestinal: Negative for abdominal pain, diarrhea, nausea and vomiting.   Genitourinary: Negative.    Musculoskeletal: Negative for arthralgias.   Skin: Negative for rash.   Psychiatric/Behavioral: Negative.        Objective:   /89 (BP Location: Left arm, Patient Position: Sitting, BP Method: Medium (Manual))   Pulse 78   Ht 5' 8" (1.727 m)   Wt 66.7 kg (147 lb)   LMP 03/05/2018 (Exact Date)   SpO2 97%   BMI " 22.35 kg/m²      Physical Exam   Constitutional: She is oriented to person, place, and time. She appears well-developed and well-nourished.   HENT:   Head: Normocephalic and atraumatic.   Eyes: Conjunctivae and EOM are normal. Pupils are equal, round, and reactive to light.   Neck: Neck supple. No thyromegaly present.   Cardiovascular: Normal rate, regular rhythm and normal heart sounds.    No murmur heard.  Pulmonary/Chest: Effort normal and breath sounds normal. No respiratory distress. She has no wheezes.   Abdominal: Soft. Bowel sounds are normal. She exhibits no distension. There is no tenderness.   Musculoskeletal: Normal range of motion.   Neurological: She is alert and oriented to person, place, and time.   Skin: Skin is warm and dry. No rash noted.   Psychiatric: She has a normal mood and affect. Judgment and thought content normal.   Vitals reviewed.      Assessment:       1. Health care maintenance    2. Chronic migraine    3. BHAVIN (generalized anxiety disorder)    4. MDD (major depressive disorder), recurrent, in partial remission    5. PTSD (post-traumatic stress disorder)    6. Fatigue, unspecified type    7. Constipation, unspecified constipation type    8. Dizziness        Plan:       Cierra was seen today for lyme disease and gi problem.    Diagnoses and all orders for this visit:    Health care maintenance  -     CBC auto differential; Future  -     Comprehensive metabolic panel; Future  -     TSH; Future    Chronic migraine  BHAVIN (generalized anxiety disorder)  MDD (major depressive disorder), recurrent, in partial remission  PTSD (post-traumatic stress disorder)  Fatigue, severe  Chronic constipation - irritable bowel syndrome? (previous colonoscopy was negative)  Recurrent episodes of dizziness  -     CBC auto differential; Future  -     Comprehensive metabolic panel; Future  -     TSH; Future  -     T4, free; Future  -     Sedimentation rate, manual; Future  -     C-reactive protein; Future  -      Lipid panel; Future  -     Vitamin D; Future  -     Vitamin B12; Future  -     Folate; Future  -     Hemoglobin A1c; Future  -     LYME DISEASE WESTERN BLOT; Future  -     B. burgdorferi Abs (Lyme Disease); Future    She was interested in doing a send off for Lyme via [facility]. While she has a hx of tick exposure and her symptoms can fit some of the symptoms seen with post-Lyme/Chronic Lyme her tick exposure was largely in non-endemic areas and no confirmed acute Lyme disease history. Will do above labs first. If lyme testing positive will have her see ID.

## 2018-03-21 ENCOUNTER — LAB VISIT (OUTPATIENT)
Dept: LAB | Facility: HOSPITAL | Age: 38
End: 2018-03-21
Attending: INTERNAL MEDICINE
Payer: COMMERCIAL

## 2018-03-21 ENCOUNTER — PATIENT MESSAGE (OUTPATIENT)
Dept: PSYCHIATRY | Facility: CLINIC | Age: 38
End: 2018-03-21

## 2018-03-21 DIAGNOSIS — F41.1 GAD (GENERALIZED ANXIETY DISORDER): ICD-10-CM

## 2018-03-21 DIAGNOSIS — F33.41 MDD (MAJOR DEPRESSIVE DISORDER), RECURRENT, IN PARTIAL REMISSION: ICD-10-CM

## 2018-03-21 DIAGNOSIS — Z00.00 HEALTH CARE MAINTENANCE: ICD-10-CM

## 2018-03-21 DIAGNOSIS — R53.83 FATIGUE, UNSPECIFIED TYPE: ICD-10-CM

## 2018-03-21 LAB
25(OH)D3+25(OH)D2 SERPL-MCNC: 16 NG/ML
ALBUMIN SERPL BCP-MCNC: 3.9 G/DL
ALP SERPL-CCNC: 56 U/L
ALT SERPL W/O P-5'-P-CCNC: 15 U/L
ANION GAP SERPL CALC-SCNC: 8 MMOL/L
AST SERPL-CCNC: 15 U/L
BASOPHILS # BLD AUTO: 0.01 K/UL
BASOPHILS NFR BLD: 0.2 %
BILIRUB SERPL-MCNC: 0.5 MG/DL
BUN SERPL-MCNC: 10 MG/DL
CALCIUM SERPL-MCNC: 9.3 MG/DL
CHLORIDE SERPL-SCNC: 104 MMOL/L
CHOLEST SERPL-MCNC: 181 MG/DL
CHOLEST/HDLC SERPL: 3 {RATIO}
CO2 SERPL-SCNC: 26 MMOL/L
CREAT SERPL-MCNC: 1 MG/DL
CRP SERPL-MCNC: 0.3 MG/L
DIFFERENTIAL METHOD: NORMAL
EOSINOPHIL # BLD AUTO: 0.1 K/UL
EOSINOPHIL NFR BLD: 1.9 %
ERYTHROCYTE [DISTWIDTH] IN BLOOD BY AUTOMATED COUNT: 12.1 %
ERYTHROCYTE [SEDIMENTATION RATE] IN BLOOD BY WESTERGREN METHOD: 24 MM/HR
EST. GFR  (AFRICAN AMERICAN): >60 ML/MIN/1.73 M^2
EST. GFR  (NON AFRICAN AMERICAN): >60 ML/MIN/1.73 M^2
ESTIMATED AVG GLUCOSE: 88 MG/DL
FOLATE SERPL-MCNC: 4.2 NG/ML
GLUCOSE SERPL-MCNC: 90 MG/DL
HBA1C MFR BLD HPLC: 4.7 %
HCT VFR BLD AUTO: 37.9 %
HDLC SERPL-MCNC: 61 MG/DL
HDLC SERPL: 33.7 %
HGB BLD-MCNC: 12.4 G/DL
LDLC SERPL CALC-MCNC: 107.4 MG/DL
LYMPHOCYTES # BLD AUTO: 2 K/UL
LYMPHOCYTES NFR BLD: 41.5 %
MCH RBC QN AUTO: 29.1 PG
MCHC RBC AUTO-ENTMCNC: 32.7 G/DL
MCV RBC AUTO: 89 FL
MONOCYTES # BLD AUTO: 0.3 K/UL
MONOCYTES NFR BLD: 6.4 %
NEUTROPHILS # BLD AUTO: 2.4 K/UL
NEUTROPHILS NFR BLD: 49.8 %
NONHDLC SERPL-MCNC: 120 MG/DL
PLATELET # BLD AUTO: 263 K/UL
PMV BLD AUTO: 10.1 FL
POTASSIUM SERPL-SCNC: 3.6 MMOL/L
PROT SERPL-MCNC: 6.9 G/DL
RBC # BLD AUTO: 4.26 M/UL
SODIUM SERPL-SCNC: 138 MMOL/L
T4 FREE SERPL-MCNC: 0.92 NG/DL
TRIGL SERPL-MCNC: 63 MG/DL
TSH SERPL DL<=0.005 MIU/L-ACNC: 3.08 UIU/ML
VIT B12 SERPL-MCNC: 463 PG/ML
WBC # BLD AUTO: 4.84 K/UL

## 2018-03-21 PROCEDURE — 82306 VITAMIN D 25 HYDROXY: CPT

## 2018-03-21 PROCEDURE — 82607 VITAMIN B-12: CPT

## 2018-03-21 PROCEDURE — 84443 ASSAY THYROID STIM HORMONE: CPT

## 2018-03-21 PROCEDURE — 86618 LYME DISEASE ANTIBODY: CPT

## 2018-03-21 PROCEDURE — 84439 ASSAY OF FREE THYROXINE: CPT

## 2018-03-21 PROCEDURE — 80053 COMPREHEN METABOLIC PANEL: CPT

## 2018-03-21 PROCEDURE — 82746 ASSAY OF FOLIC ACID SERUM: CPT

## 2018-03-21 PROCEDURE — 85651 RBC SED RATE NONAUTOMATED: CPT

## 2018-03-21 PROCEDURE — 85025 COMPLETE CBC W/AUTO DIFF WBC: CPT

## 2018-03-21 PROCEDURE — 83036 HEMOGLOBIN GLYCOSYLATED A1C: CPT

## 2018-03-21 PROCEDURE — 86140 C-REACTIVE PROTEIN: CPT

## 2018-03-21 PROCEDURE — 86617 LYME DISEASE ANTIBODY: CPT | Mod: 59

## 2018-03-21 PROCEDURE — 36415 COLL VENOUS BLD VENIPUNCTURE: CPT

## 2018-03-21 PROCEDURE — 80061 LIPID PANEL: CPT

## 2018-03-23 ENCOUNTER — OFFICE VISIT (OUTPATIENT)
Dept: PSYCHIATRY | Facility: CLINIC | Age: 38
End: 2018-03-23
Payer: COMMERCIAL

## 2018-03-23 VITALS
SYSTOLIC BLOOD PRESSURE: 116 MMHG | BODY MASS INDEX: 22.55 KG/M2 | HEART RATE: 113 BPM | HEIGHT: 68 IN | DIASTOLIC BLOOD PRESSURE: 57 MMHG | WEIGHT: 148.81 LBS

## 2018-03-23 DIAGNOSIS — F33.41 MDD (MAJOR DEPRESSIVE DISORDER), RECURRENT, IN PARTIAL REMISSION: ICD-10-CM

## 2018-03-23 DIAGNOSIS — F43.10 PTSD (POST-TRAUMATIC STRESS DISORDER): Primary | ICD-10-CM

## 2018-03-23 DIAGNOSIS — F41.0 PANIC ATTACKS: ICD-10-CM

## 2018-03-23 DIAGNOSIS — F41.1 GAD (GENERALIZED ANXIETY DISORDER): ICD-10-CM

## 2018-03-23 LAB
B BURGDOR AB SER IA-ACNC: 0.09 INDEX VALUE
B BURGDOR IGG SER QL IB: NEGATIVE
B BURGDOR IGM SER QL IB: NEGATIVE

## 2018-03-23 PROCEDURE — 99999 PR PBB SHADOW E&M-EST. PATIENT-LVL III: CPT | Mod: PBBFAC,,, | Performed by: PSYCHIATRY & NEUROLOGY

## 2018-03-23 PROCEDURE — 99213 OFFICE O/P EST LOW 20 MIN: CPT | Mod: S$GLB,,, | Performed by: PSYCHIATRY & NEUROLOGY

## 2018-03-23 RX ORDER — LAMOTRIGINE 25 MG/1
25 TABLET ORAL DAILY
Qty: 60 TABLET | Refills: 1 | Status: SHIPPED | OUTPATIENT
Start: 2018-03-23 | End: 2018-04-30

## 2018-03-23 RX ORDER — BUPROPION HYDROCHLORIDE 300 MG/1
300 TABLET ORAL DAILY
Qty: 90 TABLET | Refills: 1 | Status: SHIPPED | OUTPATIENT
Start: 2018-03-23 | End: 2018-06-15 | Stop reason: SDUPTHER

## 2018-03-23 RX ORDER — ALPRAZOLAM 0.5 MG/1
0.25 TABLET ORAL 2 TIMES DAILY PRN
Qty: 30 TABLET | Refills: 2 | Status: SHIPPED | OUTPATIENT
Start: 2018-03-23 | End: 2018-06-15 | Stop reason: SDUPTHER

## 2018-03-23 NOTE — PATIENT INSTRUCTIONS
Continue Wellbutrin and Cymbalta at current dosages.  Can continue to use Xanax 0.25mg as needed up to twice daily for panic attacks.    Start Lamictal (Lamotrigine) 25mg daily or in the evening if it makes you sleepy, for two weeks, then increase to 50mg daily (2 tablets of 25mg) for two weeks.  Check in around 3-4 weeks and let me know how you are doing.  If you miss more than 2 doses of this medication, we have to go back to 25mg again and re-taper due to risk of rash.    Email or message me with any questions.
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
## 2018-03-27 DIAGNOSIS — E55.9 VITAMIN D DEFICIENCY: Primary | ICD-10-CM

## 2018-03-27 RX ORDER — ERGOCALCIFEROL 1.25 MG/1
50000 CAPSULE ORAL
Qty: 12 CAPSULE | Refills: 0 | Status: SHIPPED | OUTPATIENT
Start: 2018-03-27 | End: 2018-07-16

## 2018-04-02 ENCOUNTER — PATIENT MESSAGE (OUTPATIENT)
Dept: INTERNAL MEDICINE | Facility: CLINIC | Age: 38
End: 2018-04-02

## 2018-04-02 DIAGNOSIS — K59.00 CONSTIPATION, UNSPECIFIED CONSTIPATION TYPE: Primary | ICD-10-CM

## 2018-04-03 ENCOUNTER — PATIENT MESSAGE (OUTPATIENT)
Dept: PSYCHIATRY | Facility: CLINIC | Age: 38
End: 2018-04-03

## 2018-04-03 RX ORDER — BISACODYL 10 MG
10 SUPPOSITORY, RECTAL RECTAL DAILY PRN
Qty: 12 SUPPOSITORY | Refills: 1 | Status: SHIPPED | OUTPATIENT
Start: 2018-04-03 | End: 2018-06-14

## 2018-04-05 ENCOUNTER — PATIENT MESSAGE (OUTPATIENT)
Dept: INTERNAL MEDICINE | Facility: CLINIC | Age: 38
End: 2018-04-05

## 2018-04-12 ENCOUNTER — PATIENT MESSAGE (OUTPATIENT)
Dept: INTERNAL MEDICINE | Facility: CLINIC | Age: 38
End: 2018-04-12

## 2018-04-12 ENCOUNTER — PATIENT MESSAGE (OUTPATIENT)
Dept: PSYCHIATRY | Facility: CLINIC | Age: 38
End: 2018-04-12

## 2018-04-12 DIAGNOSIS — Z31.41 FERTILITY TESTING: ICD-10-CM

## 2018-04-12 DIAGNOSIS — R11.0 CHRONIC NAUSEA: Primary | ICD-10-CM

## 2018-04-12 DIAGNOSIS — K59.09 CHRONIC CONSTIPATION: ICD-10-CM

## 2018-04-18 DIAGNOSIS — G43.709 CHRONIC MIGRAINE WITHOUT AURA WITHOUT STATUS MIGRAINOSUS, NOT INTRACTABLE: Primary | ICD-10-CM

## 2018-04-26 ENCOUNTER — PATIENT MESSAGE (OUTPATIENT)
Dept: NEUROLOGY | Facility: CLINIC | Age: 38
End: 2018-04-26

## 2018-04-30 ENCOUNTER — PROCEDURE VISIT (OUTPATIENT)
Dept: NEUROLOGY | Facility: CLINIC | Age: 38
End: 2018-04-30
Payer: COMMERCIAL

## 2018-04-30 VITALS
DIASTOLIC BLOOD PRESSURE: 82 MMHG | HEIGHT: 68 IN | WEIGHT: 150 LBS | BODY MASS INDEX: 22.73 KG/M2 | HEART RATE: 78 BPM | SYSTOLIC BLOOD PRESSURE: 116 MMHG

## 2018-04-30 DIAGNOSIS — G47.9 TROUBLE IN SLEEPING: ICD-10-CM

## 2018-04-30 PROCEDURE — 64615 CHEMODENERV MUSC MIGRAINE: CPT | Mod: S$GLB,,, | Performed by: NURSE PRACTITIONER

## 2018-04-30 RX ORDER — TIZANIDINE 4 MG/1
TABLET ORAL
Qty: 30 TABLET | Refills: 5 | Status: SHIPPED | OUTPATIENT
Start: 2018-04-30 | End: 2018-04-30 | Stop reason: SDUPTHER

## 2018-04-30 RX ORDER — ONDANSETRON 4 MG/1
4 TABLET, ORALLY DISINTEGRATING ORAL EVERY 6 HOURS PRN
Qty: 30 TABLET | Refills: 5 | Status: SHIPPED | OUTPATIENT
Start: 2018-04-30 | End: 2018-07-16 | Stop reason: ALTCHOICE

## 2018-04-30 RX ORDER — TIZANIDINE 4 MG/1
TABLET ORAL
Qty: 90 TABLET | Refills: 5 | Status: SHIPPED | OUTPATIENT
Start: 2018-04-30 | End: 2018-11-28 | Stop reason: SDUPTHER

## 2018-04-30 NOTE — PROCEDURES
SUBJECTIVE:  Patient ID: Cierra Irby  Chief Complaint: Headache and Botulinum Toxin Injection    History of Present Illness:  Cierra Irby is a 38 y.o. female who presents to clinic alone for follow-up of headaches and Botox injections.     Recommendations made at last Office Visit on 2/2/2018:  - Botox administered in clinic for Chronic Migraine (see below)   - Continue Cymbalta daily - for depression, but may provide some help with Migraines  - May consider starting Propranolol vs Lamictal at next visit if further preventive therapy is needed, must avoid topiramate and zonisamide due to hx of kidney stones    - Recommend she order Cefaly device   - Continue Zanaflex 4 mg nightly   - For migraine abortive - given Rx of Sprix nasal spray   - For nausea - refilled Zofran ODT   - Urged her to restart tracking her headaches  - Continue regular follow-up with mental health   0 urged her to start tracking headaches   - RTC in 3 months for repeat Botox injections or sooner if needed     04/30/2018- Interval History:  Headaches have been slightly less frequent since last Botox injections, she does not think she has had any severe migraines in the last month or so.  Though she admits her migraines tend to be better around this time of the year.  She is happy with her current medication regimen and does not feel adjustments need to be made at this time. She does want to continue with Botox injections for chronic migraine.  She uses Sprix when she gets a migraine, which she does find helps some.  In the past she has taken Zanaflex which was very helpful when she would start to get headaches in the mornings, this also helps her to sleep at night which she feels is useful as when she gets poor sleep that can trigger a migraine, requests refill of zanaflex.    Otherwise, information below is still accurate and current.     2/2/2018 - Interval History:  Since Botox injections started on 10/19/2017, she has noticed the  "frequency of her migraines is going down, thinks she went a whole month without a migraine.  But states she has noticed her headaches have been increasing in frequency over the last week.  She has established care with mental health and is regularly seen in clinic, Prozac was discontinued and she was started on Cymbalta 90 mg to supplement Wellbutrin as well as Prazosin nightly for anxiety.  She also attended the 8 week mindfulness class which she also found to be helpful for her anxiety and learning how to cope with the pain of her migraines.  She did not get Cefaly device.  States she does not have any medication to take when she gets a migraine so she just has to wait until it passes.  Overall, she feels the Botox is helping her migraines and would like to repeat the injections.      Initial GUTIERRES HPI:  37 y.o. female with history of migraines, anxiety, and vertigo, presents to clinic alone for evaluation of her headaches.  Headaches initially began when she was in , but then this went away until 2002, when her headaches returned.  Recently moved here from [place], previous patient of [facility] headache center.  She is currently doing Botox, started in May, 3rd round of Botox due in October.  Botox was doing well for treatment, but with the stress of her move to [place], she feels things have kind of gotten mixed up.  She states "this has been one of the worst years I have had, it hasn't been this bad since 2009."  Got very sick in February and had infusion treatments done. Migraines are always right sided, in her forehead, temple, but can be occipitally located.  Pain is described as throbbing, feels like she can feel her veins pulsing.  Onset can be over 30 minutes, but can be longer like hours, depending on what type of headaches she is having.  When she gets a headache, she tries not to take anything, just to prevent the bounce backs. In the past she has taken Hydrocodone and other pain medicatinos. " States Hydrocodone works the most, but she tries not to take it as it makes her feel itchy.  Headaches are at least 5x/week, gets a full blown migraine about 1-2 times per week.  Migraines last at least 24 hours, but can be up to 3 days.  Since beginning the Botox, they have not been lasting 3 days.  Before she started the Botox, she could not get out of the cycle of her headache.  In the past she has experienced an aura, but does not typically experience an aura any longer.  Headaches are typically worse from August to March, and are worsened with movement.  Pain can be anywhere from 3-10 out of 10.  She is currently able to bear children and is not taking oral contraceptive pills, however she is not trying to get pregnant and her and her boyfriend do use protection.  Associated Symptoms - nausea, vertigo, photo/phonophobia, cannot think, vomit, neck pain  Triggers - stress, rain, flying, lemon, detergent smells, strong perfume smells, rollercoaster   Aggravating Factors - moving, looking up and down, any noise/smells/lights   Alleviating Factors - an ice cap, peppermint oil helps with nausea, laying down, eating helps   Head Trauma? Infection? Fever? Cancer? Pregnancy? <-- had 3 minor concussions and then another 4 years ago   Recent Changes - Recently moved from [place] to [place] (for boyfriend),   Sleep - does better when she takes the Zanaflex, does not feel rested in the morning, sleeps on average 6-7 hours per night   Family Hx of Migraines (mom)  Positives in bold: Hx of Kidney Stones, asthma, GI bleed (stomach ulcers in the past), osteoporosis, CAD/MI, CVA/TIA, DM      Treatments Tried and Response  Imitrex - no help, made her feel worse  Maxalt - no  Zomig PO - no  Sprix - kind of worked  Cambia - no   Topamax - gave her many kidney stones   Lithium - was given for her migraine   Wellbutrin - for anxiety  Lexapro - for anxiety   Verapamil - no help   Depakote - no   Magnesium - has to look at dose    Zanaflex - +/-  Zomig NS - helps   Botox - helping   Sprix - helps    Cymbalta - for depression, may be helping with headaches as well     Current Medications:    ALPRAZolam (XANAX) 0.5 MG tablet, Take 0.5 tablets (0.25 mg total) by mouth 2 (two) times daily as needed for Anxiety., Disp: 30 tablet, Rfl: 2    buPROPion (WELLBUTRIN XL) 300 MG 24 hr tablet, Take 1 tablet (300 mg total) by mouth once daily., Disp: 90 tablet, Rfl: 1    DULoxetine (CYMBALTA) 30 MG capsule, Take 3 capsules (90 mg total) by mouth once daily. Take 3 tablets of 30mg total to equal 90mg daily, Disp: 270 capsule, Rfl: 1    ergocalciferol (ERGOCALCIFEROL) 50,000 unit Cap, Take 1 capsule (50,000 Units total) by mouth every 7 days., Disp: 12 capsule, Rfl: 0    ketorolac 15.75 mg/spray Spry, 15.75 mg by Nasal route every 6 (six) hours., Disp: 5 each, Rfl: 5    meclizine (ANTIVERT) 25 mg tablet, Take 25 mg by mouth 3 (three) times daily as needed., Disp: , Rfl:     ondansetron (ZOFRAN-ODT) 4 MG TbDL, Take 1 tablet (4 mg total) by mouth every 6 (six) hours as needed., Disp: 30 tablet, Rfl: 5    bisacodyl (DULCOLAX) 10 mg Supp, Place 1 suppository (10 mg total) rectally daily as needed., Disp: 12 suppository, Rfl: 1    hydrOXYzine pamoate (VISTARIL) 50 MG Cap, Take 1 capsule by mouth every 8 hours for 3 days. No alcohol or driving with medication., Disp: 15 capsule, Rfl: 0    prazosin (MINIPRESS) 2 MG Cap, Take 1 capsule (2 mg total) by mouth every evening., Disp: 30 capsule, Rfl: 2    tiZANidine (ZANAFLEX) 4 MG tablet, TAKE 1 TABLET BY MOUTH NIGHTLY AS NEEDED FOR HEADACHE. NO ALCOHOL, NO DRIVING WITH MEDICATION, Disp: 90 tablet, Rfl: 5    UNABLE TO FIND, Mindfulness-Based Stress Reduction class to help with stress reduction and migraine prevention., Disp: 1 Units, Rfl: 5    UNABLE TO FIND, Cefaly + Electrodes. Wear 20-30 minutes daily.  Dx: Migraines. [email or web address], Disp: 1 Device, Rfl: 2    Current Facility-Administered Medications:  "    onabotulinumtoxina injection 200 Units, 200 Units, Intramuscular, Q90 Days, Ellen Bueno, FNP, 200 Units at 04/30/18 1331    Review of Systems - as per HPI, otherwise a balanced 10 systems review is negative.    OBJECTIVE:  Vitals:  /82 (BP Location: Right arm, Patient Position: Sitting, BP Method: Large (Automatic))   Pulse 78   Ht 5' 8" (1.727 m)   Wt 68 kg (150 lb)   BMI 22.81 kg/m²     Physical Exam:  Constitutional: she appears well-developed and well-nourished. she is well groomed. NAD   HENT:    Head: Normocephalic and atraumatic  Skin: Skin is warm and dry.  Psychiatric: Mood and affect are normal    Neuro: Patient is awake, alert, and oriented to person, place, and time. Language is intact and fluent.  Recent and remote memory are intact.  Normal attention and concentration.  Facial movement is symmetric.  Gait is normal.     Review of Data:   Notes from psychiatry and Dr. Copeland reviewed.   Labs:  Lab Visit on 03/21/2018   Component Date Value Ref Range Status    WBC 03/21/2018 4.84  3.90 - 12.70 K/uL Final    RBC 03/21/2018 4.26  4.00 - 5.40 M/uL Final    Hemoglobin 03/21/2018 12.4  12.0 - 16.0 g/dL Final    Hematocrit 03/21/2018 37.9  37.0 - 48.5 % Final    MCV 03/21/2018 89  82 - 98 fL Final    MCH 03/21/2018 29.1  27.0 - 31.0 pg Final    MCHC 03/21/2018 32.7  32.0 - 36.0 g/dL Final    RDW 03/21/2018 12.1  11.5 - 14.5 % Final    Platelets 03/21/2018 263  150 - 350 K/uL Final    MPV 03/21/2018 10.1  9.2 - 12.9 fL Final    Gran # (ANC) 03/21/2018 2.4  1.8 - 7.7 K/uL Final    Lymph # 03/21/2018 2.0  1.0 - 4.8 K/uL Final    Mono # 03/21/2018 0.3  0.3 - 1.0 K/uL Final    Eos # 03/21/2018 0.1  0.0 - 0.5 K/uL Final    Baso # 03/21/2018 0.01  0.00 - 0.20 K/uL Final    Gran% 03/21/2018 49.8  38.0 - 73.0 % Final    Lymph% 03/21/2018 41.5  18.0 - 48.0 % Final    Mono% 03/21/2018 6.4  4.0 - 15.0 % Final    Eosinophil% 03/21/2018 1.9  0.0 - 8.0 % Final    Basophil% " 03/21/2018 0.2  0.0 - 1.9 % Final    Differential Method 03/21/2018 Automated   Final    Sodium 03/21/2018 138  136 - 145 mmol/L Final    Potassium 03/21/2018 3.6  3.5 - 5.1 mmol/L Final    Chloride 03/21/2018 104  95 - 110 mmol/L Final    CO2 03/21/2018 26  23 - 29 mmol/L Final    Glucose 03/21/2018 90  70 - 110 mg/dL Final    BUN, Bld 03/21/2018 10  6 - 20 mg/dL Final    Creatinine 03/21/2018 1.0  0.5 - 1.4 mg/dL Final    Calcium 03/21/2018 9.3  8.7 - 10.5 mg/dL Final    Total Protein 03/21/2018 6.9  6.0 - 8.4 g/dL Final    Albumin 03/21/2018 3.9  3.5 - 5.2 g/dL Final    Total Bilirubin 03/21/2018 0.5  0.1 - 1.0 mg/dL Final    Alkaline Phosphatase 03/21/2018 56  55 - 135 U/L Final    AST 03/21/2018 15  10 - 40 U/L Final    ALT 03/21/2018 15  10 - 44 U/L Final    Anion Gap 03/21/2018 8  8 - 16 mmol/L Final    eGFR if African American 03/21/2018 >60  >60 mL/min/1.73 m^2 Final    eGFR if non African American 03/21/2018 >60  >60 mL/min/1.73 m^2 Final    TSH 03/21/2018 3.082  0.400 - 4.000 uIU/mL Final    Free T4 03/21/2018 0.92  0.71 - 1.51 ng/dL Final    Sed Rate 03/21/2018 24* 0 - 20 mm/Hr Final    CRP 03/21/2018 0.3  0.0 - 8.2 mg/L Final    Cholesterol 03/21/2018 181  120 - 199 mg/dL Final    Triglycerides 03/21/2018 63  30 - 150 mg/dL Final    HDL 03/21/2018 61  40 - 75 mg/dL Final    LDL Cholesterol 03/21/2018 107.4  63.0 - 159.0 mg/dL Final    HDL/Chol Ratio 03/21/2018 33.7  20.0 - 50.0 % Final    Total Cholesterol/HDL Ratio 03/21/2018 3.0  2.0 - 5.0 Final    Non-HDL Cholesterol 03/21/2018 120  mg/dL Final    Vit D, 25-Hydroxy 03/21/2018 16* 30 - 96 ng/mL Final    Vitamin B-12 03/21/2018 463  210 - 950 pg/mL Final    Folate 03/21/2018 4.2  4.0 - 24.0 ng/mL Final    Hemoglobin A1C 03/21/2018 4.7  4.0 - 5.6 % Final    Estimated Avg Glucose 03/21/2018 88  68 - 131 mg/dL Final    LYME AB IGG BY WB: 03/21/2018 Negative  Negative Final    Lyme Ab IgM by WB: 03/21/2018 Negative   Negative Final    Lyme Ab 03/21/2018 0.09  <0.90 Index Value Final     Note: I have independently reviewed any/all imaging/labs/tests and agree with the report (s) as documented.  Any discrepancies will be as noted/demarcated by free text.  SHEFALI HILLMAN 4/30/2018    ASSESSMENT:  1. Chronic migraine    2. Trouble in sleeping      PLAN:  - Botox administered in clinic for Chronic Migraine (see below)   - Continue Cymbalta daily   - Has Sprix available as needed for headaches   - Refilled Zanaflex to take as needed for neck stiffness and sleep   - RTC in 3 months for repeat Botox injections or sooner if needed     Orders Placed This Encounter    ondansetron (ZOFRAN-ODT) 4 MG TbDL       All questions and concerns addressed.  Patient verbalizes understanding and is agreeable with the above stated treatment plan.      PROCEDURE NOTE:  BOTOX was performed as an indicated therapy for intractable chronic migraine headaches given that the patient failed more than 2 headache medications    Two patient identifiers were confirmed with the patient prior to performing this procedure. A time out to determine correct patient and and agreement on procedure performed was conducted prior to the procedure.      Botulinum Toxin Injection Procedure   Procedure: Chemical neurolysis   After risks and benefits were explained including bleeding, infection, worsening of pain, damage to the areas being injected, weakness of muscles, loss of muscle control, dysphagia if injecting the head or neck, facial droop if injecting the facial area, painful injection, allergic or other reaction to the medications being injected, and the failure of the procedure to help the problem, a signed consent was obtained.   The patient was placed in a comfortable area and the sites to be treated were identified.The area to be treated was prepped three times with alcohol and the alcohol allowed to dry. Next, a 30 gauge needle was used to inject the medication in the  area to be treated.      Total Botox used: 125 Units   Botox wastage: 75 Units     Injection sites:    muscle bilaterally ( a total of 10 units divided into 2 sites)   Procerus muscle (5 units)   Frontalis muscle bilaterally (a total of 20 units divided into 4 sites)   Temporalis muscle bilaterally (a total of 40 units divided into 8 sites)   Occipitalis muscle bilaterally (a total of 30 units divided into 6 sites)   Cervical paraspinal muscles (a total of 20 units divided into 4 sites)   Complications: none   RTC for the next Botox injection: 3 months     CC: MD Ellen Martell, KIRA-ADRIANA  [facility]   [phone]

## 2018-05-21 ENCOUNTER — PATIENT MESSAGE (OUTPATIENT)
Dept: INTERNAL MEDICINE | Facility: CLINIC | Age: 38
End: 2018-05-21

## 2018-05-21 DIAGNOSIS — R53.83 FATIGUE, UNSPECIFIED TYPE: ICD-10-CM

## 2018-05-21 DIAGNOSIS — R19.8 ALTERNATING CONSTIPATION AND DIARRHEA: Primary | ICD-10-CM

## 2018-05-21 DIAGNOSIS — R10.84 GENERALIZED ABDOMINAL PAIN: ICD-10-CM

## 2018-05-24 ENCOUNTER — PATIENT MESSAGE (OUTPATIENT)
Dept: NEUROLOGY | Facility: CLINIC | Age: 38
End: 2018-05-24

## 2018-05-24 RX ORDER — MECLIZINE HYDROCHLORIDE 25 MG/1
25 TABLET ORAL 3 TIMES DAILY PRN
Qty: 30 TABLET | Refills: 2 | Status: SHIPPED | OUTPATIENT
Start: 2018-05-24 | End: 2018-09-04 | Stop reason: SDUPTHER

## 2018-06-11 ENCOUNTER — LAB VISIT (OUTPATIENT)
Dept: LAB | Facility: HOSPITAL | Age: 38
End: 2018-06-11
Attending: INTERNAL MEDICINE
Payer: COMMERCIAL

## 2018-06-11 DIAGNOSIS — R53.83 FATIGUE, UNSPECIFIED TYPE: ICD-10-CM

## 2018-06-11 DIAGNOSIS — R10.84 GENERALIZED ABDOMINAL PAIN: ICD-10-CM

## 2018-06-11 DIAGNOSIS — R19.8 ALTERNATING CONSTIPATION AND DIARRHEA: ICD-10-CM

## 2018-06-11 LAB
ALBUMIN SERPL BCP-MCNC: 3.5 G/DL
ALP SERPL-CCNC: 79 U/L
ALT SERPL W/O P-5'-P-CCNC: 42 U/L
ANION GAP SERPL CALC-SCNC: 7 MMOL/L
AST SERPL-CCNC: 31 U/L
BILIRUB SERPL-MCNC: 0.4 MG/DL
BUN SERPL-MCNC: 9 MG/DL
CALCIUM SERPL-MCNC: 8.7 MG/DL
CHLORIDE SERPL-SCNC: 106 MMOL/L
CO2 SERPL-SCNC: 26 MMOL/L
CREAT SERPL-MCNC: 0.8 MG/DL
EST. GFR  (AFRICAN AMERICAN): >60 ML/MIN/1.73 M^2
EST. GFR  (NON AFRICAN AMERICAN): >60 ML/MIN/1.73 M^2
GLUCOSE SERPL-MCNC: 87 MG/DL
POTASSIUM SERPL-SCNC: 3.8 MMOL/L
PROT SERPL-MCNC: 6.1 G/DL
SODIUM SERPL-SCNC: 139 MMOL/L

## 2018-06-11 PROCEDURE — 80053 COMPREHEN METABOLIC PANEL: CPT

## 2018-06-11 PROCEDURE — 36415 COLL VENOUS BLD VENIPUNCTURE: CPT

## 2018-06-12 ENCOUNTER — PATIENT MESSAGE (OUTPATIENT)
Dept: INTERNAL MEDICINE | Facility: CLINIC | Age: 38
End: 2018-06-12

## 2018-06-12 DIAGNOSIS — R10.11 RIGHT UPPER QUADRANT ABDOMINAL PAIN: Primary | ICD-10-CM

## 2018-06-13 ENCOUNTER — HOSPITAL ENCOUNTER (OUTPATIENT)
Dept: RADIOLOGY | Facility: HOSPITAL | Age: 38
Discharge: HOME | End: 2018-06-13
Attending: INTERNAL MEDICINE
Payer: COMMERCIAL

## 2018-06-13 ENCOUNTER — OFFICE VISIT (OUTPATIENT)
Dept: INTERNAL MEDICINE | Facility: CLINIC | Age: 38
End: 2018-06-13
Payer: COMMERCIAL

## 2018-06-13 VITALS
WEIGHT: 156 LBS | HEIGHT: 68 IN | SYSTOLIC BLOOD PRESSURE: 102 MMHG | DIASTOLIC BLOOD PRESSURE: 64 MMHG | HEART RATE: 75 BPM | BODY MASS INDEX: 23.64 KG/M2 | OXYGEN SATURATION: 98 %

## 2018-06-13 DIAGNOSIS — R10.11 RIGHT UPPER QUADRANT ABDOMINAL PAIN: ICD-10-CM

## 2018-06-13 DIAGNOSIS — R10.31 BILATERAL LOWER ABDOMINAL DISCOMFORT: ICD-10-CM

## 2018-06-13 DIAGNOSIS — R19.7 NAUSEA, VOMITING AND DIARRHEA: ICD-10-CM

## 2018-06-13 DIAGNOSIS — R11.2 NAUSEA, VOMITING AND DIARRHEA: ICD-10-CM

## 2018-06-13 DIAGNOSIS — R10.32 BILATERAL LOWER ABDOMINAL DISCOMFORT: ICD-10-CM

## 2018-06-13 DIAGNOSIS — R10.13 EPIGASTRIC PAIN: Primary | ICD-10-CM

## 2018-06-13 DIAGNOSIS — R19.8 ALTERNATING CONSTIPATION AND DIARRHEA: ICD-10-CM

## 2018-06-13 PROCEDURE — 76700 US EXAM ABDOM COMPLETE: CPT | Mod: TC

## 2018-06-13 PROCEDURE — 99999 PR PBB SHADOW E&M-EST. PATIENT-LVL III: CPT | Mod: PBBFAC,,, | Performed by: INTERNAL MEDICINE

## 2018-06-13 PROCEDURE — 3008F BODY MASS INDEX DOCD: CPT | Mod: CPTII,S$GLB,, | Performed by: INTERNAL MEDICINE

## 2018-06-13 PROCEDURE — 99214 OFFICE O/P EST MOD 30 MIN: CPT | Mod: S$GLB,,, | Performed by: INTERNAL MEDICINE

## 2018-06-13 PROCEDURE — 76700 US EXAM ABDOM COMPLETE: CPT | Mod: 26,,, | Performed by: RADIOLOGY

## 2018-06-13 NOTE — TELEPHONE ENCOUNTER
Spoke with pt. Pt is scheduled for ultrasound on today for 2:45 pm at outpatient imaging center. Advised pt that if pain worsens or becomes severe, then she needs to be seen in ER. Pt voices understanding. I put pt on schedule for 3:20 pm this afternoon for a visit with you. Is this okay? Please adv.

## 2018-06-13 NOTE — PROGRESS NOTES
"Subjective:       Patient ID: Cierra Irby is a 38 y.o. female.    Chief Complaint: Abdominal Pain (pt complains of epigastric pain, possibly due to excessive vomiting. ) and Diarrhea (pt complains of constant diarrhea along with nausea/vomiting especially after eating.)    HPI   39 yo F here for urgent eval of epigastric pain after eating, nausea and vomiting after eating and loose stools.   Abdominal ultrasound today was okay. No gallstones  Other symptoms are fatigue, previously with chronic constipation.   Hx MDD, BHAVIN, and PTSD followed by psychiatry.  Previous colonoscopies were normal. Told IBS or stress.   Has GI appt tomorrow.   Went to [place] in May 2018. Sx were present prior to this trip.   Vitamin d helping a little with fatigue, still present.   Review of Systems   Constitutional: Negative for fever.   Respiratory: Negative for shortness of breath.    Cardiovascular: Negative for chest pain.   Gastrointestinal: Positive for abdominal pain, constipation, diarrhea, nausea and vomiting.   Musculoskeletal: Negative.    Skin: Negative.        Objective:   /64 (BP Location: Right arm, Patient Position: Sitting, BP Method: Small (Manual))   Pulse 75   Ht 5' 8" (1.727 m)   Wt 70.8 kg (156 lb)   SpO2 98%   BMI 23.72 kg/m²      Physical Exam   Constitutional: She is oriented to person, place, and time. She appears well-developed and well-nourished. No distress.   HENT:   Head: Normocephalic and atraumatic.   Cardiovascular: Normal rate and regular rhythm.    Pulmonary/Chest: Effort normal. No respiratory distress. She has no wheezes. She has no rales.   Abdominal: Soft. She exhibits no distension and no mass. There is no tenderness. There is no guarding.   Neurological: She is alert and oriented to person, place, and time.   Skin: Skin is warm and dry. She is not diaphoretic.   Psychiatric: She has a normal mood and affect. Her behavior is normal.       Assessment:       1. Epigastric pain    2. " Right upper quadrant abdominal pain    3. Alternating constipation and diarrhea    4. Bilateral lower abdominal discomfort    5. Nausea, vomiting and diarrhea        Plan:       Cierra was seen today for abdominal pain and diarrhea.    Diagnoses and all orders for this visit:    Epigastric pain  -     H. pylori antigen, stool; Future  -     Stool Exam-Ova,Cysts,Parasites; Future  -     Stool culture; Future    Right upper quadrant abdominal pain; Nausea, vomiting and diarrhea  -     NM Hepatobiliary Imaging HIDA; Future    Alternating constipation and diarrhea  -     H. pylori antigen, stool; Future  -     Stool Exam-Ova,Cysts,Parasites; Future  -     Stool culture; Future    Bilateral lower abdominal discomfort  -     H. pylori antigen, stool; Future  -     Stool Exam-Ova,Cysts,Parasites; Future  -     Stool culture; Future

## 2018-06-14 ENCOUNTER — OFFICE VISIT (OUTPATIENT)
Dept: GASTROENTEROLOGY | Facility: CLINIC | Age: 38
End: 2018-06-14
Payer: COMMERCIAL

## 2018-06-14 VITALS
HEIGHT: 68 IN | SYSTOLIC BLOOD PRESSURE: 110 MMHG | BODY MASS INDEX: 23.22 KG/M2 | DIASTOLIC BLOOD PRESSURE: 71 MMHG | WEIGHT: 153.25 LBS | HEART RATE: 74 BPM

## 2018-06-14 DIAGNOSIS — K58.1 IRRITABLE BOWEL SYNDROME WITH CONSTIPATION: Primary | ICD-10-CM

## 2018-06-14 PROCEDURE — 99999 PR PBB SHADOW E&M-EST. PATIENT-LVL IV: CPT | Mod: PBBFAC,,, | Performed by: NURSE PRACTITIONER

## 2018-06-14 PROCEDURE — 99204 OFFICE O/P NEW MOD 45 MIN: CPT | Mod: S$GLB,,, | Performed by: NURSE PRACTITIONER

## 2018-06-14 PROCEDURE — 3008F BODY MASS INDEX DOCD: CPT | Mod: CPTII,S$GLB,, | Performed by: NURSE PRACTITIONER

## 2018-06-14 NOTE — PROGRESS NOTES
[facility] Note    Reason for Visit:  The encounter diagnosis was Irritable bowel syndrome with constipation.    PCP:   Arielle Copeland   [address] / [place] [address]    Referring MD:  Arielle Copeland Md  [address] [place] [address]    HPI:  This is a 38 y.o. female here for evaluation of abd pain and irregular bowel habits.  Seen by PCP in 4/2018 for constipation.  Seen by PCP yesterday for RUQ pain, nausea/vomiting, and diarrhea. Stools studies, CT HIDA ordered. abd us yesterday was normal.    Abdominal pain   ONSET:last year  LOCATION:bilat lower abd  DURATION:intermittent daily  CHARACTER:cramping, dull ache  ASSOCIATED/ALLEVIATING/AGGRAVAITING:nausea daily-some improvement Zofran 4 mg 5 days weekly. Vomits three times weekly. No fevers. Pain is worse with meals and associated with BMs.   RADIATION:to back sometimes  TEMPORAL:  SEVERITY:3/10. Can increase to 9-10/10.     Reflux - no  Dysphagia/odynophagia - no   Bowel habits - tends toward constipation since last year. Having 1 bristol type 1-2 BM/week. Loose stools once monthly: several loose stools in one day- usually after taking laxatives. Some improvement with enemas PRN, some improvement with magnesium supplementation, no improvement with colace 4 tabs daily PRN.   GI bleeding - denies hematochezia, hematemesis, melena, BRBPR, black/tarry stools, and coffee ground emesis  NSAID usage - ibuprofen this week, but usually avoids.    ROS:  Constitutional: No fevers, no chills, No unintentional weight loss, + fatigue,   ENT: No allergies  CV: No chest pain, no palpitations, no perif. edema, no sob on exertion  Pulm: No cough, No shortness of breath, no wheezes, no sputum  Ophtho: No vision changes  GI: see HPI;   Derm: No rash  Heme: No lymphadenopathy, No bruising  MSK: No arthritis, no muscle pain, no muscle weakness  : No dysuria, No hematuria  Endo: No hot or cold intolerance  Neuro: No syncope,  No seizure,       Medical History:  has a past medical history of Anxiety; Depression; and Migraines.    Surgical History:  has a past surgical history that includes Colonoscopy (2009) and Esophagogastroduodenoscopy (2009).    Family History: family history includes Coronary artery disease (age of onset: 60) in her father; Hypertension in her mother; Kidney failure in her sister; Migraines in her mother; No Known Problems in her sister..     Social History:  reports that she has never smoked. She has never used smokeless tobacco. She reports that she drinks alcohol. She reports that she does not use drugs.    Review of patient's allergies indicates:  No Known Allergies    Current Outpatient Prescriptions   Medication Sig    buPROPion (WELLBUTRIN XL) 300 MG 24 hr tablet Take 1 tablet (300 mg total) by mouth once daily.    DULoxetine (CYMBALTA) 30 MG capsule Take 3 capsules (90 mg total) by mouth once daily. Take 3 tablets of 30mg total to equal 90mg daily    hydrOXYzine pamoate (VISTARIL) 50 MG Cap Take 1 capsule by mouth every 8 hours for 3 days. No alcohol or driving with medication.    ketorolac 15.75 mg/spray Spry 15.75 mg by Nasal route every 6 (six) hours.    meclizine (ANTIVERT) 25 mg tablet Take 1 tablet (25 mg total) by mouth 3 (three) times daily as needed.    ondansetron (ZOFRAN-ODT) 4 MG TbDL Take 1 tablet (4 mg total) by mouth every 6 (six) hours as needed.    prazosin (MINIPRESS) 2 MG Cap Take 1 capsule (2 mg total) by mouth every evening.    tiZANidine (ZANAFLEX) 4 MG tablet TAKE 1 TABLET BY MOUTH NIGHTLY AS NEEDED FOR HEADACHE. NO ALCOHOL, NO DRIVING WITH MEDICATION    UNABLE TO FIND Mindfulness-Based Stress Reduction class to help with stress reduction and migraine prevention.    UNABLE TO FIND Cefaly + Electrodes. Wear 20-30 minutes daily.  Dx: Migraines. [email or web address]    ALPRAZolam (XANAX) 0.5 MG tablet Take 0.5 tablets (0.25 mg total) by mouth 2 (two) times daily as needed for Anxiety.  "   ergocalciferol (ERGOCALCIFEROL) 50,000 unit Cap Take 1 capsule (50,000 Units total) by mouth every 7 days.    linaclotide (LINZESS) 145 mcg Cap capsule Take 1 capsule (145 mcg total) by mouth once daily.     Current Facility-Administered Medications   Medication    onabotulinumtoxina injection 200 Units       Objective Findings:    Vital Signs:  /71   Pulse 74   Ht 5' 8" (1.727 m)   Wt 69.5 kg (153 lb 3.5 oz)   LMP 06/02/2018   BMI 23.30 kg/m²   Body mass index is 23.3 kg/m².    Physical Exam:  General Appearance: Well appearing in no acute distress  Head:   Normocephalic, without obvious abnormality  Eyes:    No scleral icterus, EOMI  ENT: Neck supple, Lips, mucosa, and tongue normal; teeth and gums normal  Lungs: CTA bilaterally in anterior and posterior fields, no wheezes, no crackles.  Heart:  Regular rate and rhythm, S1, S2 normal, no murmurs heard  Abdomen: Soft, bilateral lower abd tenderness, non distended with positive bowel sounds in all four quadrants. No hepatosplenomegaly, ascites, or mass  Extremities: 2+ radial pulses, no clubbing, cyanosis or edema  Skin: No rash to exposed areas  Neurologic: A&Ox4      Labs:  Lab Results   Component Value Date    WBC 4.84 03/21/2018    HGB 12.4 03/21/2018    HCT 37.9 03/21/2018     03/21/2018    CHOL 181 03/21/2018    TRIG 63 03/21/2018    HDL 61 03/21/2018    ALT 42 06/11/2018    AST 31 06/11/2018     06/11/2018    K 3.8 06/11/2018     06/11/2018    CREATININE 0.8 06/11/2018    BUN 9 06/11/2018    CO2 26 06/11/2018    TSH 3.082 03/21/2018    HGBA1C 4.7 03/21/2018       Imaging:  abd us 6/13/18:NL  Endoscopy:    Per pt, EGD in 2009 for diarrhea-NL  Per pt, colon 2009 for diarrhea- NL  Per pt colon 2008 for diarrhea-NL  Assessment:  1. Irritable bowel syndrome with constipation           Recommendations:  1. IBS-start linzess 145 mcg daily. Proceed with testing per PCP. Discussed the multifactorial pathophysiology of IBS.  " Discussed treatment options.  Provided handouts.         Follow-up in about 1 month (around 7/14/2018) for IBS-C.      Order summary:  Orders Placed This Encounter    linaclotide (LINZESS) 145 mcg Cap capsule         Thank you so much for allowing me to participate in the care of Cierra Geejeanna Cuba, NILAM, FNP-C

## 2018-06-14 NOTE — LETTER
June 14, 2018      Arielle Copeland MD  [address]  [place] [address]           [facility]  [address]  [facility] [address]  Phone: [phone]  Fax: [phone]          Patient: Cierra Irby   MR Number: [id number]   YOB: 1980   Date of Visit: 6/14/2018       Dear Dr. Arielle Copeland:    Thank you for referring Cierra Irby to me for evaluation. Attached you will find relevant portions of my assessment and plan of care.    If you have questions, please do not hesitate to call me. I look forward to following Cierra Irby along with you.    Sincerely,    Marcela Cuba NP    Enclosure  CC:  No Recipients    If you would like to receive this communication electronically, please contact [email or web address] or [phone] to request more information on [employer] Link access.    For providers and/or their staff who would like to refer a patient to [facility], please contact us through our one-stop-shop provider referral line, [facility], at [phone].    If you feel you have received this communication in error or would no longer like to receive these types of communications, please e-mail [email or web address]          verbalization

## 2018-06-15 ENCOUNTER — LAB VISIT (OUTPATIENT)
Dept: LAB | Facility: HOSPITAL | Age: 38
End: 2018-06-15
Attending: INTERNAL MEDICINE
Payer: COMMERCIAL

## 2018-06-15 ENCOUNTER — OFFICE VISIT (OUTPATIENT)
Dept: PSYCHIATRY | Facility: CLINIC | Age: 38
End: 2018-06-15
Payer: COMMERCIAL

## 2018-06-15 VITALS
DIASTOLIC BLOOD PRESSURE: 60 MMHG | BODY MASS INDEX: 23.14 KG/M2 | WEIGHT: 152.69 LBS | HEIGHT: 68 IN | SYSTOLIC BLOOD PRESSURE: 114 MMHG | HEART RATE: 91 BPM

## 2018-06-15 DIAGNOSIS — R10.32 BILATERAL LOWER ABDOMINAL DISCOMFORT: ICD-10-CM

## 2018-06-15 DIAGNOSIS — F43.10 PTSD (POST-TRAUMATIC STRESS DISORDER): ICD-10-CM

## 2018-06-15 DIAGNOSIS — R10.31 BILATERAL LOWER ABDOMINAL DISCOMFORT: ICD-10-CM

## 2018-06-15 DIAGNOSIS — F33.41 MDD (MAJOR DEPRESSIVE DISORDER), RECURRENT, IN PARTIAL REMISSION: ICD-10-CM

## 2018-06-15 DIAGNOSIS — F41.1 GAD (GENERALIZED ANXIETY DISORDER): ICD-10-CM

## 2018-06-15 DIAGNOSIS — R10.13 EPIGASTRIC PAIN: ICD-10-CM

## 2018-06-15 DIAGNOSIS — R19.8 ALTERNATING CONSTIPATION AND DIARRHEA: ICD-10-CM

## 2018-06-15 DIAGNOSIS — F41.0 PANIC ATTACKS: ICD-10-CM

## 2018-06-15 PROCEDURE — 87427 SHIGA-LIKE TOXIN AG IA: CPT

## 2018-06-15 PROCEDURE — 3008F BODY MASS INDEX DOCD: CPT | Mod: CPTII,S$GLB,, | Performed by: PSYCHIATRY & NEUROLOGY

## 2018-06-15 PROCEDURE — 99213 OFFICE O/P EST LOW 20 MIN: CPT | Mod: S$GLB,,, | Performed by: PSYCHIATRY & NEUROLOGY

## 2018-06-15 PROCEDURE — 87046 STOOL CULTR AEROBIC BACT EA: CPT | Mod: 59

## 2018-06-15 PROCEDURE — 87209 SMEAR COMPLEX STAIN: CPT

## 2018-06-15 PROCEDURE — 87338 HPYLORI STOOL AG IA: CPT

## 2018-06-15 PROCEDURE — 87045 FECES CULTURE AEROBIC BACT: CPT

## 2018-06-15 PROCEDURE — 99999 PR PBB SHADOW E&M-EST. PATIENT-LVL III: CPT | Mod: PBBFAC,,, | Performed by: PSYCHIATRY & NEUROLOGY

## 2018-06-15 RX ORDER — ALPRAZOLAM 0.5 MG/1
0.25 TABLET ORAL 2 TIMES DAILY PRN
Qty: 30 TABLET | Refills: 2 | Status: SHIPPED | OUTPATIENT
Start: 2018-06-15 | End: 2018-09-26 | Stop reason: SDUPTHER

## 2018-06-15 RX ORDER — DULOXETIN HYDROCHLORIDE 30 MG/1
90 CAPSULE, DELAYED RELEASE ORAL DAILY
Qty: 270 CAPSULE | Refills: 1 | Status: SHIPPED | OUTPATIENT
Start: 2018-06-15 | End: 2018-09-26 | Stop reason: SDUPTHER

## 2018-06-15 RX ORDER — BUPROPION HYDROCHLORIDE 300 MG/1
300 TABLET ORAL DAILY
Qty: 90 TABLET | Refills: 1 | Status: SHIPPED | OUTPATIENT
Start: 2018-06-15 | End: 2018-09-26 | Stop reason: SDUPTHER
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
## 2018-06-18 ENCOUNTER — HOSPITAL ENCOUNTER (OUTPATIENT)
Dept: RADIOLOGY | Facility: HOSPITAL | Age: 38
Discharge: HOME | End: 2018-06-18
Attending: INTERNAL MEDICINE
Payer: COMMERCIAL

## 2018-06-18 ENCOUNTER — PATIENT MESSAGE (OUTPATIENT)
Dept: INTERNAL MEDICINE | Facility: CLINIC | Age: 38
End: 2018-06-18

## 2018-06-18 DIAGNOSIS — R19.7 NAUSEA, VOMITING AND DIARRHEA: ICD-10-CM

## 2018-06-18 DIAGNOSIS — R11.2 NAUSEA, VOMITING AND DIARRHEA: ICD-10-CM

## 2018-06-18 LAB
BACTERIA STL CULT: NORMAL
E COLI SXT1 STL QL IA: NEGATIVE
E COLI SXT2 STL QL IA: NEGATIVE
O+P STL TRI STN: NORMAL

## 2018-06-18 PROCEDURE — 78227 HEPATOBIL SYST IMAGE W/DRUG: CPT | Mod: 26,,, | Performed by: RADIOLOGY

## 2018-06-18 PROCEDURE — A9537 TC99M MEBROFENIN: HCPCS

## 2018-06-19 ENCOUNTER — PATIENT MESSAGE (OUTPATIENT)
Dept: INTERNAL MEDICINE | Facility: CLINIC | Age: 38
End: 2018-06-19

## 2018-06-20 LAB — H PYLORI AG STL QL: NOT DETECTED

## 2018-06-22 NOTE — PROGRESS NOTES
This encounter was reviewed by me and case was discussed with the resident physician. I agree with the assessment and  treatment plan as stated. Continue to encourage her to consider med change, and referral to CPT.    Arielle Braxton MD

## 2018-06-29 ENCOUNTER — PATIENT MESSAGE (OUTPATIENT)
Dept: GASTROENTEROLOGY | Facility: CLINIC | Age: 38
End: 2018-06-29

## 2018-07-16 ENCOUNTER — OFFICE VISIT (OUTPATIENT)
Dept: GASTROENTEROLOGY | Facility: CLINIC | Age: 38
End: 2018-07-16
Payer: COMMERCIAL

## 2018-07-16 VITALS
DIASTOLIC BLOOD PRESSURE: 77 MMHG | SYSTOLIC BLOOD PRESSURE: 104 MMHG | HEIGHT: 68 IN | WEIGHT: 153.69 LBS | BODY MASS INDEX: 23.29 KG/M2 | HEART RATE: 77 BPM

## 2018-07-16 DIAGNOSIS — K58.1 IRRITABLE BOWEL SYNDROME WITH CONSTIPATION: Primary | ICD-10-CM

## 2018-07-16 DIAGNOSIS — R11.0 NAUSEA: ICD-10-CM

## 2018-07-16 PROCEDURE — 99999 PR PBB SHADOW E&M-EST. PATIENT-LVL III: CPT | Mod: PBBFAC,,, | Performed by: NURSE PRACTITIONER

## 2018-07-16 PROCEDURE — 99214 OFFICE O/P EST MOD 30 MIN: CPT | Mod: S$GLB,,, | Performed by: NURSE PRACTITIONER

## 2018-07-16 PROCEDURE — 3008F BODY MASS INDEX DOCD: CPT | Mod: CPTII,S$GLB,, | Performed by: NURSE PRACTITIONER

## 2018-07-16 RX ORDER — LUBIPROSTONE 24 UG/1
24 CAPSULE ORAL 2 TIMES DAILY WITH MEALS
Qty: 60 CAPSULE | Refills: 3 | Status: SHIPPED | OUTPATIENT
Start: 2018-07-16 | End: 2018-09-28 | Stop reason: SDUPTHER

## 2018-07-16 RX ORDER — PROMETHAZINE HYDROCHLORIDE 12.5 MG/1
12.5 TABLET ORAL EVERY 6 HOURS PRN
Qty: 120 TABLET | Refills: 3 | Status: SHIPPED | OUTPATIENT
Start: 2018-07-16

## 2018-07-16 NOTE — PROGRESS NOTES
[facility] Note    Reason for Visit:  The primary encounter diagnosis was Irritable bowel syndrome with constipation. A diagnosis of Nausea was also pertinent to this visit.    PCP:   Arielle Copeland       Referring MD:  No referring provider defined for this encounter.    HPI:This is a 38 y.o. female here for f/u evaluation IBS-C  Last visit with me last month. abd us, HIDA normal. Stools study per pcp negative for o/p.    Started linzess 145 mcg with constipation ( 1 BM/week) added Miralax once daily w/o improvement. Added colace 4 pills (last Thursday) with diarrhea . Currently on linzess 145 mcg daily with 6 loose stool daily. Having less abd pain d/t it would only come with constipation. Nausea is improving.     Abdominal pain   ONSET:last year  LOCATION:bilat lower abd  DURATION:intermittent; less often since more diarrhea.  CHARACTER:cramping, dull ache  ASSOCIATED/ALLEVIATING/AGGRAVAITING:worse with constipation. associated nausea improved from  Daily to few days weekly-some improvement Zofran 4 mg PRN, but zofran constipates her. Vomits once weekly-1-2 hrs after eating. No fevers. Pain is worse with meals and associated with BMs.   RADIATION:to back sometimes  TEMPORAL:  SEVERITY:3/10.     Reflux - no  Dysphagia/odynophagia - no   Bowel habits - tends toward constipation since last year. Some improvement with enemas PRN, some improvement with magnesium supplementation, no improvement with colace 4 tabs daily PRN.   GI bleeding - denies hematochezia, hematemesis, melena, BRBPR, black/tarry stools, and coffee ground emesis  NSAID usage - usually avoids.    ROS:  Constitutional: No fevers, no chills, No unintentional weight loss, + fatigue,   ENT: No allergies  CV: No chest pain, no palpitations, no perif. edema, no sob on exertion  Pulm: No cough, No shortness of breath, no wheezes, no sputum  Ophtho: No vision changes  GI: see HPI;   Derm: No rash  Heme: No lymphadenopathy, No  bruising  MSK: No arthritis, no muscle pain, no muscle weakness  : No dysuria, No hematuria  Endo: No hot or cold intolerance  Neuro: No syncope, No seizure,       Medical History:  has a past medical history of Anxiety; Depression; and Migraines.    Surgical History:  has a past surgical history that includes Colonoscopy (2009) and Esophagogastroduodenoscopy (2009).    Family History: family history includes Coronary artery disease (age of onset: 60) in her father; Hypertension in her mother; Kidney failure in her sister; Migraines in her mother; No Known Problems in her sister..     Social History:  reports that she has never smoked. She has never used smokeless tobacco. She reports that she drinks alcohol. She reports that she does not use drugs.    Review of patient's allergies indicates:  No Known Allergies    Current Outpatient Prescriptions   Medication Sig    buPROPion (WELLBUTRIN XL) 300 MG 24 hr tablet Take 1 tablet (300 mg total) by mouth once daily.    DULoxetine (CYMBALTA) 30 MG capsule Take 3 capsules (90 mg total) by mouth once daily. Take 3 tablets of 30mg total to equal 90mg daily    ketorolac 15.75 mg/spray Spry 15.75 mg by Nasal route every 6 (six) hours.    meclizine (ANTIVERT) 25 mg tablet Take 1 tablet (25 mg total) by mouth 3 (three) times daily as needed.    tiZANidine (ZANAFLEX) 4 MG tablet TAKE 1 TABLET BY MOUTH NIGHTLY AS NEEDED FOR HEADACHE. NO ALCOHOL, NO DRIVING WITH MEDICATION    UNABLE TO FIND Mindfulness-Based Stress Reduction class to help with stress reduction and migraine prevention.    UNABLE TO FIND Cefaly + Electrodes. Wear 20-30 minutes daily.  Dx: Migraines. [email or web address]    ALPRAZolam (XANAX) 0.5 MG tablet Take 0.5 tablets (0.25 mg total) by mouth 2 (two) times daily as needed for Anxiety.    lubiprostone (AMITIZA) 24 MCG Cap Take 1 capsule (24 mcg total) by mouth 2 (two) times daily with meals.    promethazine (PHENERGAN) 12.5 MG Tab Take 1 tablet (12.5 mg  "total) by mouth every 6 (six) hours as needed. For nausea     Current Facility-Administered Medications   Medication    onabotulinumtoxina injection 200 Units    onabotulinumtoxina injection 200 Units       Objective Findings:    Vital Signs:  /77   Pulse 77   Ht 5' 8" (1.727 m)   Wt 69.7 kg (153 lb 10.6 oz)   LMP 06/26/2018   BMI 23.36 kg/m²   Body mass index is 23.36 kg/m².    Physical Exam:  General Appearance: Well appearing in no acute distress  Head:   Normocephalic, without obvious abnormality  Eyes:    No scleral icterus, EOMI  ENT: Neck supple, Lips, mucosa, and tongue normal; teeth and gums normal  Lungs: CTA bilaterally in anterior and posterior fields, no wheezes, no crackles.  Heart:  Regular rate and rhythm, S1, S2 normal, no murmurs heard  Abdomen: Soft, non tender, non distended with positive bowel sounds in all four quadrants. No hepatosplenomegaly, ascites, or mass  Extremities: 2+ radial pulses, no clubbing, cyanosis or edema  Skin: No rash to exposed areas  Neurologic: A&Ox4      Labs:  Lab Results   Component Value Date    WBC 4.84 03/21/2018    HGB 12.4 03/21/2018    HCT 37.9 03/21/2018     03/21/2018    CHOL 181 03/21/2018    TRIG 63 03/21/2018    HDL 61 03/21/2018    ALT 42 06/11/2018    AST 31 06/11/2018     06/11/2018    K 3.8 06/11/2018     06/11/2018    CREATININE 0.8 06/11/2018    BUN 9 06/11/2018    CO2 26 06/11/2018    TSH 3.082 03/21/2018    HGBA1C 4.7 03/21/2018       Imaging:  HIDA 6/18/18:  NL  abd us 6/13/18:NL  Endoscopy:    Per pt, EGD in 2009 for diarrhea-NL  Per pt, colon 2009 for diarrhea- NL  Per pt colon 2008 for diarrhea-NL  Assessment:  1. Irritable bowel syndrome with constipation    2. Nausea           Recommendations:  1. IBS-stop linzess, miralax, colace. Start Amitiza 24 mcg BID.   2. Nausea- worse with constipation. Try phenergan PRN  instead of zofran for nausea.    Follow-up in about 1 month (around 8/16/2018) for IBS C.      Order " summary:  Orders Placed This Encounter    lubiprostone (AMITIZA) 24 MCG Cap    promethazine (PHENERGAN) 12.5 MG Tab         Thank you so much for allowing me to participate in the care of Cierra Phillips Mahamed Cuba, NILAM, FNP-C

## 2018-07-17 ENCOUNTER — PROCEDURE VISIT (OUTPATIENT)
Dept: NEUROLOGY | Facility: CLINIC | Age: 38
End: 2018-07-17
Payer: COMMERCIAL

## 2018-07-17 ENCOUNTER — TELEPHONE (OUTPATIENT)
Dept: PHARMACY | Facility: CLINIC | Age: 38
End: 2018-07-17

## 2018-07-17 VITALS
DIASTOLIC BLOOD PRESSURE: 88 MMHG | WEIGHT: 153.88 LBS | BODY MASS INDEX: 23.32 KG/M2 | HEIGHT: 68 IN | SYSTOLIC BLOOD PRESSURE: 113 MMHG | HEART RATE: 72 BPM

## 2018-07-17 DIAGNOSIS — G47.9 TROUBLE IN SLEEPING: ICD-10-CM

## 2018-07-17 DIAGNOSIS — K58.9 IRRITABLE BOWEL SYNDROME, UNSPECIFIED TYPE: ICD-10-CM

## 2018-07-17 DIAGNOSIS — F41.1 GAD (GENERALIZED ANXIETY DISORDER): ICD-10-CM

## 2018-07-17 DIAGNOSIS — F33.41 MDD (MAJOR DEPRESSIVE DISORDER), RECURRENT, IN PARTIAL REMISSION: ICD-10-CM

## 2018-07-17 DIAGNOSIS — G43.009 MIGRAINE WITHOUT AURA AND WITHOUT STATUS MIGRAINOSUS, NOT INTRACTABLE: ICD-10-CM

## 2018-07-17 PROCEDURE — 99213 OFFICE O/P EST LOW 20 MIN: CPT | Mod: 25,S$GLB,, | Performed by: NURSE PRACTITIONER

## 2018-07-17 PROCEDURE — 64615 CHEMODENERV MUSC MIGRAINE: CPT | Mod: S$GLB,,, | Performed by: NURSE PRACTITIONER

## 2018-07-17 RX ORDER — ZOLMITRIPTAN 5 MG/1
SPRAY NASAL
Qty: 12 EACH | Refills: 11 | Status: SHIPPED | OUTPATIENT
Start: 2018-07-17 | End: 2018-09-04

## 2018-07-17 NOTE — PROCEDURES
"SUBJECTIVE:  Patient ID: Cierra Irby  Chief Complaint: Botulinum Toxin Injection    History of Present Illness:  Cierra Irby is a 38 y.o. female who presents to clinic alone for follow-up of headaches and Botox injections.     Recommendations made at last Office Visit on 4/30/2018:  - Botox administered in clinic for Chronic Migraine (see below)   - Continue Cymbalta daily   - Has Sprix available as needed for headaches   - Refilled Zanaflex to take as needed for neck stiffness and sleep   - RTC in 3 months for repeat Botox injections or sooner if needed     07/17/2018- Interval History:  Headaches have been pretty good since last visit, down to less than once per week.  Only severe migraine since last visit occurred yesterday around 4 PM.  States she slept very poorly last night due to the pain.  During the summer months, historically her headaches are better.  States headaches tend to begin to worsen in August/September.  We discussed option of adding preventive therapy to her regimen which she is interested in.  She is happy with the response of her headaches to Botox and does wish to continue with injections as it is giving her significant relief from her migraines.      Secondarily, reports she has been suffering with intense IBS symptoms over the last few months.  She is established and seen regularly with GI.  She has also continued to see her psychiatrist and therapist.  States she has not been sleeping well and has been suffering with severe "fatigue", has not mentioned to neither her psychiatrist, therapist nor PCP regarding poor sleep and fatigue.     04/30/2018- Interval History:  Headaches have been slightly less frequent since last Botox injections, she does not think she has had any severe migraines in the last month or so.  Though she admits her migraines tend to be better around this time of the year.  She is happy with her current medication regimen and does not feel adjustments need to " "be made at this time. She does want to continue with Botox injections for chronic migraine.  She uses Sprix when she gets a migraine, which she does find helps some.  In the past she has taken Zanaflex which was very helpful when she would start to get headaches in the mornings, this also helps her to sleep at night which she feels is useful as when she gets poor sleep that can trigger a migraine, requests refill of zanaflex.       2/2/2018 - Interval History:  Since Botox injections started on 10/19/2017, she has noticed the frequency of her migraines is going down, thinks she went a whole month without a migraine.  But states she has noticed her headaches have been increasing in frequency over the last week.  She has established care with mental health and is regularly seen in clinic, Prozac was discontinued and she was started on Cymbalta 90 mg to supplement Wellbutrin as well as Prazosin nightly for anxiety.  She also attended the 8 week mindfulness class which she also found to be helpful for her anxiety and learning how to cope with the pain of her migraines.  She did not get Cefaly device.  States she does not have any medication to take when she gets a migraine so she just has to wait until it passes.  Overall, she feels the Botox is helping her migraines and would like to repeat the injections.      Initial GUTIERRES HPI:  37 y.o. female with history of migraines, anxiety, and vertigo, presents to clinic alone for evaluation of her headaches.  Headaches initially began when she was in , but then this went away until 2002, when her headaches returned.  Recently moved here from [place], previous patient of [facility] headache center.  She is currently doing Botox, started in May, 3rd round of Botox due in October.  Botox was doing well for treatment, but with the stress of her move to [place], she feels things have kind of gotten mixed up.  She states "this has been one of the worst years I have had, it " "hasn't been this bad since 2009."  Got very sick in February and had infusion treatments done. Migraines are always right sided, in her forehead, temple, but can be occipitally located.  Pain is described as throbbing, feels like she can feel her veins pulsing.  Onset can be over 30 minutes, but can be longer like hours, depending on what type of headaches she is having.  When she gets a headache, she tries not to take anything, just to prevent the bounce backs. In the past she has taken Hydrocodone and other pain medicatinos. States Hydrocodone works the most, but she tries not to take it as it makes her feel itchy.  Headaches are at least 5x/week, gets a full blown migraine about 1-2 times per week.  Migraines last at least 24 hours, but can be up to 3 days.  Since beginning the Botox, they have not been lasting 3 days.  Before she started the Botox, she could not get out of the cycle of her headache.  In the past she has experienced an aura, but does not typically experience an aura any longer.  Headaches are typically worse from August to March, and are worsened with movement.  Pain can be anywhere from 3-10 out of 10.  She is currently able to bear children and is not taking oral contraceptive pills, however she is not trying to get pregnant and her and her boyfriend do use protection.  Associated Symptoms - nausea, vertigo, photo/phonophobia, cannot think, vomit, neck pain  Triggers - stress, rain, flying, lemon, detergent smells, strong perfume smells, rollercoaster   Aggravating Factors - moving, looking up and down, any noise/smells/lights   Alleviating Factors - an ice cap, peppermint oil helps with nausea, laying down, eating helps   Head Trauma? Infection? Fever? Cancer? Pregnancy? <-- had 3 minor concussions and then another 4 years ago   Recent Changes - Recently moved from [place] to [place] (for boyfriend),   Sleep - does better when she takes the Zanaflex, does not feel rested in the morning, " sleeps on average 6-7 hours per night   Family Hx of Migraines (mom)  Positives in bold: Hx of Kidney Stones, asthma, GI bleed (stomach ulcers in the past), osteoporosis, CAD/MI, CVA/TIA, DM      Treatments Tried and Response  Imitrex - no help, made her feel worse  Maxalt - no  Zomig PO - no  Sprix - kind of worked  Cambia - no   Topamax - gave her many kidney stones   Lithium - was given for her migraine   Wellbutrin - for anxiety  Lexapro - for anxiety   Verapamil - no help   Depakote - no   Magnesium - has to look at dose   Zanaflex - +/-  Zomig NS - helps   Botox - helping   Sprix - helps    Cymbalta - for depression, may be helping with headaches as well   Aimovig - recommended today  Zomig NS - represcribed today     Current Medications:    buPROPion (WELLBUTRIN XL) 300 MG 24 hr tablet, Take 1 tablet (300 mg total) by mouth once daily., Disp: 90 tablet, Rfl: 1    DULoxetine (CYMBALTA) 30 MG capsule, Take 3 capsules (90 mg total) by mouth once daily. Take 3 tablets of 30mg total to equal 90mg daily, Disp: 270 capsule, Rfl: 1    ketorolac 15.75 mg/spray Spry, 15.75 mg by Nasal route every 6 (six) hours., Disp: 5 each, Rfl: 5    lubiprostone (AMITIZA) 24 MCG Cap, Take 1 capsule (24 mcg total) by mouth 2 (two) times daily with meals., Disp: 60 capsule, Rfl: 3    meclizine (ANTIVERT) 25 mg tablet, Take 1 tablet (25 mg total) by mouth 3 (three) times daily as needed., Disp: 30 tablet, Rfl: 2    promethazine (PHENERGAN) 12.5 MG Tab, Take 1 tablet (12.5 mg total) by mouth every 6 (six) hours as needed. For nausea, Disp: 120 tablet, Rfl: 3    tiZANidine (ZANAFLEX) 4 MG tablet, TAKE 1 TABLET BY MOUTH NIGHTLY AS NEEDED FOR HEADACHE. NO ALCOHOL, NO DRIVING WITH MEDICATION, Disp: 90 tablet, Rfl: 5    UNABLE TO FIND, Cefaly + Electrodes. Wear 20-30 minutes daily.  Dx: Migraines. [email or web address], Disp: 1 Device, Rfl: 2    ALPRAZolam (XANAX) 0.5 MG tablet, Take 0.5 tablets (0.25 mg total) by mouth 2 (two) times daily  "as needed for Anxiety., Disp: 30 tablet, Rfl: 2    erenumab-aooe 70 mg/mL AtIn, Inject 140 mg into the skin every 28 days., Disp: 2 Syringe, Rfl: 11    ZOLMitriptan (ZOMIG) 5 mg Spry, 1 spray in nostril at onset of migraine, can repeat in 2 hrs if needed. Max 2 sprays/day. Max 3 days/week., Disp: 12 each, Rfl: 11    Current Facility-Administered Medications:     onabotulinumtoxina injection 200 Units, 200 Units, Intramuscular, Q90 Days, Ellen Bueno, FNP, 200 Units at 07/17/18 0857    onabotulinumtoxina injection 200 Units, 200 Units, Intramuscular, Q90 Days, Ellen Bueno, FNP    Review of Systems - as per HPI, otherwise a balanced 10 systems review is negative.    OBJECTIVE:  Vitals:  /88   Pulse 72   Ht 5' 8" (1.727 m)   Wt 69.8 kg (153 lb 14.1 oz)   LMP 06/26/2018   BMI 23.40 kg/m²     Physical Exam:  Constitutional: she appears well-developed and well-nourished. she is well groomed. NAD     Review of Data:   Notes from GI reviewed.   Labs:  Lab Visit on 06/15/2018   Component Date Value Ref Range Status    H. Pylori Antigen, Stool 06/15/2018 Not detected  Not detected Final    Stool Exam-Ova,Cysts,Parasites 06/15/2018 No ova or parasites seen   Final    Stool Culture 06/15/2018 No Salmonella,Shigella,Vibrio,Campylobacter,Yersinia isolated.   Final    Shiga Toxin 1 E.coli 06/15/2018 Negative   Final    Shiga Toxin 2 E.coli 06/15/2018 Negative   Final   Lab Visit on 06/11/2018   Component Date Value Ref Range Status    Preg Test, Ur 06/11/2018 Negative   Final   Lab Visit on 06/11/2018   Component Date Value Ref Range Status    Sodium 06/11/2018 139  136 - 145 mmol/L Final    Potassium 06/11/2018 3.8  3.5 - 5.1 mmol/L Final    Chloride 06/11/2018 106  95 - 110 mmol/L Final    CO2 06/11/2018 26  23 - 29 mmol/L Final    Glucose 06/11/2018 87  70 - 110 mg/dL Final    BUN, Bld 06/11/2018 9  6 - 20 mg/dL Final    Creatinine 06/11/2018 0.8  0.5 - 1.4 mg/dL Final    Calcium " 06/11/2018 8.7  8.7 - 10.5 mg/dL Final    Total Protein 06/11/2018 6.1  6.0 - 8.4 g/dL Final    Albumin 06/11/2018 3.5  3.5 - 5.2 g/dL Final    Total Bilirubin 06/11/2018 0.4  0.1 - 1.0 mg/dL Final    Alkaline Phosphatase 06/11/2018 79  55 - 135 U/L Final    AST 06/11/2018 31  10 - 40 U/L Final    ALT 06/11/2018 42  10 - 44 U/L Final    Anion Gap 06/11/2018 7* 8 - 16 mmol/L Final    eGFR if African American 06/11/2018 >60  >60 mL/min/1.73 m^2 Final    eGFR if non African American 06/11/2018 >60  >60 mL/min/1.73 m^2 Final     Imaging:  No results found for this or any previous visit.    Note: I have independently reviewed any/all imaging/labs/tests and agree with the report (s) as documented.  Any discrepancies will be as noted/demarcated by free text.  SHEFALI HILLMAN 7/17/2018    ASSESSMENT:  1. Chronic migraine    2. Migraine without aura and without status migrainosus, not intractable    3. MDD (major depressive disorder), recurrent, in partial remission    4. BHAVIN (generalized anxiety disorder)    5. Irritable bowel syndrome, unspecified type    6. Trouble in sleeping      PLAN:  - Botox administered in clinic for Chronic Migraine (see below)   - Start Aimovig 140 mg monthly SQ injection  - Continue Cymbalta, Wellbutrin, and Zanaflex for migraine prevention    - For migraine abortive - given Zomig NS   - For rescue - has Sprix available   - MDD/BHAVIN - currently taking wellbutrin and cymbalta with prn xanax, she is established with mental health providers who she sees regularly   - IBS - continue regular f/up with GI, will avoid oral triptans   - Trouble in sleep - encouraged her to resume taking zanaflex nightly as this can aid with sleep, also recommend she mention to her psychiatrist and/or psychiatrist regarding fatigue  - RTC in 12 weeks for repeat Botox injections or sooner if needed     Orders Placed This Encounter    erenumab-aooe 70 mg/mL AtIn    ZOLMitriptan (ZOMIG) 5 mg Spry     All questions and  concerns addressed.  Patient verbalizes understanding and is agreeable with the above stated treatment plan.      PROCEDURE NOTE:  BOTOX was performed as an indicated therapy for intractable chronic migraine headaches given that the patient failed more than 2 headache medications    Two patient identifiers were confirmed with the patient prior to performing this procedure. A time out to determine correct patient and and agreement on procedure performed was conducted prior to the procedure.      Botulinum Toxin Injection Procedure   Procedure: Chemical neurolysis   After risks and benefits were explained including bleeding, infection, worsening of pain, damage to the areas being injected, weakness of muscles, loss of muscle control, dysphagia if injecting the head or neck, facial droop if injecting the facial area, painful injection, allergic or other reaction to the medications being injected, and the failure of the procedure to help the problem, a signed consent was obtained.   The patient was placed in a comfortable area and the sites to be treated were identified.The area to be treated was prepped three times with alcohol and the alcohol allowed to dry. Next, a 30 gauge needle was used to inject the medication in the area to be treated.      Total Botox used: 155 Units   Botox wastage: 45 Units     Injection sites:    muscle bilaterally ( a total of 10 units divided into 2 sites)   Procerus muscle (5 units)   Frontalis muscle bilaterally (a total of 20 units divided into 4 sites)   Temporalis muscle bilaterally (a total of 40 units divided into 8 sites)   Occipitalis muscle bilaterally (a total of 30 units divided into 6 sites)   Cervical paraspinal muscles (a total of 20 units divided into 4 sites)   Trapezius muscle bilaterally (a total of 30 units divided into 6 sites)   Complications: none   RTC for the next Botox injection: 12 weeks     CC: MD Ellen Martell,  [facility]AL[facility]rology   [phone]

## 2018-07-19 NOTE — TELEPHONE ENCOUNTER
DOCUMENTATION ONLY  The prior authorization request for Aimovig was denied by the patient's insurance.   Forwarded to the clinical pharmacist for review. 07/19 7:23am ARR

## 2018-07-23 ENCOUNTER — PATIENT MESSAGE (OUTPATIENT)
Dept: NEUROLOGY | Facility: CLINIC | Age: 38
End: 2018-07-23

## 2018-07-23 ENCOUNTER — PATIENT MESSAGE (OUTPATIENT)
Dept: GASTROENTEROLOGY | Facility: CLINIC | Age: 38
End: 2018-07-23

## 2018-08-28 ENCOUNTER — TELEPHONE (OUTPATIENT)
Dept: NEUROLOGY | Facility: CLINIC | Age: 38
End: 2018-08-28

## 2018-08-28 NOTE — TELEPHONE ENCOUNTER
"----- Message from Kentrell Davila PharmD sent at 8/28/2018 12:50 PM CDT -----  Regarding: Aimovig Denial   Good afternoon,     The prior authorization for Aimovig was denied for Cierra Irby ([id number]). Her plan states that she "must not have received an injection of a drug called botulinum toxin in the last 2 months to help prevent headaches".     Would you like us to fax a copy of the denial letter to the office to figure out how to proceed?    Thanks in advance,     Kentrell Davila, PharmD  Specialty Pharmacy Clinical Pharmacist  [facility]  P: [phone]     "

## 2018-08-29 ENCOUNTER — PATIENT MESSAGE (OUTPATIENT)
Dept: ADMINISTRATIVE | Facility: OTHER | Age: 38
End: 2018-08-29

## 2018-09-04 ENCOUNTER — PATIENT MESSAGE (OUTPATIENT)
Dept: NEUROLOGY | Facility: CLINIC | Age: 38
End: 2018-09-04

## 2018-09-04 DIAGNOSIS — G43.009 MIGRAINE WITHOUT AURA AND WITHOUT STATUS MIGRAINOSUS, NOT INTRACTABLE: ICD-10-CM

## 2018-09-04 RX ORDER — MECLIZINE HYDROCHLORIDE 25 MG/1
25 TABLET ORAL 3 TIMES DAILY PRN
Qty: 30 TABLET | Refills: 2 | Status: SHIPPED | OUTPATIENT
Start: 2018-09-04 | End: 2018-11-28 | Stop reason: SDUPTHER

## 2018-09-04 RX ORDER — ZOLMITRIPTAN 5 MG/1
TABLET, FILM COATED ORAL
Qty: 12 TABLET | Refills: 5 | Status: SHIPPED | OUTPATIENT
Start: 2018-09-04

## 2018-09-10 ENCOUNTER — TELEPHONE (OUTPATIENT)
Dept: PHARMACY | Facility: CLINIC | Age: 38
End: 2018-09-10

## 2018-09-14 ENCOUNTER — PATIENT MESSAGE (OUTPATIENT)
Dept: ADMINISTRATIVE | Facility: OTHER | Age: 38
End: 2018-09-14

## 2018-09-26 ENCOUNTER — OFFICE VISIT (OUTPATIENT)
Dept: PSYCHIATRY | Facility: CLINIC | Age: 38
End: 2018-09-26
Payer: COMMERCIAL

## 2018-09-26 VITALS
DIASTOLIC BLOOD PRESSURE: 75 MMHG | BODY MASS INDEX: 23.04 KG/M2 | WEIGHT: 152 LBS | HEIGHT: 68 IN | SYSTOLIC BLOOD PRESSURE: 109 MMHG | HEART RATE: 103 BPM

## 2018-09-26 DIAGNOSIS — F41.1 GAD (GENERALIZED ANXIETY DISORDER): ICD-10-CM

## 2018-09-26 DIAGNOSIS — F41.0 PANIC ATTACKS: ICD-10-CM

## 2018-09-26 DIAGNOSIS — F33.41 MDD (MAJOR DEPRESSIVE DISORDER), RECURRENT, IN PARTIAL REMISSION: ICD-10-CM

## 2018-09-26 DIAGNOSIS — F43.10 PTSD (POST-TRAUMATIC STRESS DISORDER): ICD-10-CM

## 2018-09-26 PROCEDURE — 90833 PSYTX W PT W E/M 30 MIN: CPT | Mod: S$GLB,,, | Performed by: NURSE PRACTITIONER

## 2018-09-26 PROCEDURE — 99999 PR PBB SHADOW E&M-EST. PATIENT-LVL II: CPT | Mod: PBBFAC,,, | Performed by: NURSE PRACTITIONER

## 2018-09-26 PROCEDURE — 3008F BODY MASS INDEX DOCD: CPT | Mod: CPTII,S$GLB,, | Performed by: NURSE PRACTITIONER

## 2018-09-26 PROCEDURE — 99213 OFFICE O/P EST LOW 20 MIN: CPT | Mod: S$GLB,,, | Performed by: NURSE PRACTITIONER

## 2018-09-26 RX ORDER — DULOXETIN HYDROCHLORIDE 30 MG/1
90 CAPSULE, DELAYED RELEASE ORAL DAILY
Qty: 270 CAPSULE | Refills: 1 | Status: SHIPPED | OUTPATIENT
Start: 2018-09-26 | End: 2018-10-26

## 2018-09-26 RX ORDER — BUPROPION HYDROCHLORIDE 300 MG/1
300 TABLET ORAL DAILY
Qty: 90 TABLET | Refills: 2 | Status: SHIPPED | OUTPATIENT
Start: 2018-09-26

## 2018-09-26 RX ORDER — ALPRAZOLAM 0.5 MG/1
0.25 TABLET ORAL 2 TIMES DAILY PRN
Qty: 30 TABLET | Refills: 2 | Status: SHIPPED | OUTPATIENT
Start: 2018-09-26 | End: 2018-09-28 | Stop reason: SDUPTHER
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
## 2018-09-27 ENCOUNTER — PATIENT MESSAGE (OUTPATIENT)
Dept: PSYCHIATRY | Facility: CLINIC | Age: 38
End: 2018-09-27

## 2018-09-27 ENCOUNTER — TELEPHONE (OUTPATIENT)
Dept: GASTROENTEROLOGY | Facility: CLINIC | Age: 38
End: 2018-09-27

## 2018-09-27 ENCOUNTER — PATIENT MESSAGE (OUTPATIENT)
Dept: NEUROLOGY | Facility: CLINIC | Age: 38
End: 2018-09-27

## 2018-09-27 DIAGNOSIS — F41.0 PANIC ATTACKS: ICD-10-CM

## 2018-09-28 ENCOUNTER — OFFICE VISIT (OUTPATIENT)
Dept: GASTROENTEROLOGY | Facility: CLINIC | Age: 38
End: 2018-09-28
Payer: COMMERCIAL

## 2018-09-28 VITALS
BODY MASS INDEX: 22.88 KG/M2 | HEIGHT: 68 IN | DIASTOLIC BLOOD PRESSURE: 81 MMHG | WEIGHT: 151 LBS | SYSTOLIC BLOOD PRESSURE: 104 MMHG | HEART RATE: 91 BPM

## 2018-09-28 DIAGNOSIS — K58.1 IRRITABLE BOWEL SYNDROME WITH CONSTIPATION: ICD-10-CM

## 2018-09-28 PROCEDURE — 99213 OFFICE O/P EST LOW 20 MIN: CPT | Mod: S$GLB,,, | Performed by: NURSE PRACTITIONER

## 2018-09-28 PROCEDURE — 99999 PR PBB SHADOW E&M-EST. PATIENT-LVL IV: CPT | Mod: PBBFAC,,, | Performed by: NURSE PRACTITIONER

## 2018-09-28 PROCEDURE — 3008F BODY MASS INDEX DOCD: CPT | Mod: CPTII,S$GLB,, | Performed by: NURSE PRACTITIONER

## 2018-09-28 RX ORDER — OXCARBAZEPINE 150 MG/1
TABLET, FILM COATED ORAL
Refills: 2 | COMMUNITY
Start: 2018-07-16 | End: 2018-10-12

## 2018-09-28 RX ORDER — NITROFURANTOIN 25; 75 MG/1; MG/1
CAPSULE ORAL
Refills: 0 | COMMUNITY
Start: 2018-08-28 | End: 2018-10-12

## 2018-09-28 RX ORDER — HYDROXYCHLOROQUINE SULFATE 200 MG/1
TABLET, FILM COATED ORAL
Refills: 2 | COMMUNITY
Start: 2018-08-28 | End: 2018-10-12

## 2018-09-28 RX ORDER — ALPRAZOLAM 0.5 MG/1
0.5 TABLET ORAL 2 TIMES DAILY PRN
Qty: 30 TABLET | Refills: 2 | Status: SHIPPED | OUTPATIENT
Start: 2018-09-28 | End: 2018-10-28

## 2018-09-28 RX ORDER — RIFAMPIN 300 MG/1
CAPSULE ORAL
Refills: 2 | COMMUNITY
Start: 2018-08-28 | End: 2018-10-12

## 2018-09-28 RX ORDER — NYSTATIN 500000 [USP'U]/1
TABLET, COATED ORAL
Refills: 1 | COMMUNITY
Start: 2018-09-04 | End: 2018-10-12

## 2018-09-28 RX ORDER — AZITHROMYCIN 250 MG/1
TABLET, FILM COATED ORAL
Refills: 2 | COMMUNITY
Start: 2018-08-28 | End: 2018-10-12

## 2018-09-28 RX ORDER — LUBIPROSTONE 24 UG/1
24 CAPSULE ORAL 2 TIMES DAILY WITH MEALS
Qty: 180 CAPSULE | Refills: 3 | Status: SHIPPED | OUTPATIENT
Start: 2018-09-28 | End: 2018-12-27

## 2018-09-28 RX ORDER — CEFUROXIME AXETIL 500 MG/1
TABLET ORAL
Refills: 2 | COMMUNITY
Start: 2018-08-28 | End: 2018-10-12

## 2018-09-28 NOTE — TELEPHONE ENCOUNTER
Cody Norton,    I sent an electronic prescription for you xanax 0.5mg po daily with 2 refills to your pharmacy.  Please let us know if there any problems.    Have a great weekend    Lita

## 2018-09-28 NOTE — PROGRESS NOTES
[facility] Note    Reason for Visit:  The encounter diagnosis was Irritable bowel syndrome with constipation.    PCP:   Arielle Copeland       Referring MD:  No referring provider defined for this encounter.    HPI:  This is a 38 y.o. female here for f/u evaluation IBS-C  Last visit with me in 7/2018. previous HIDA normal. Stools study per pcp negative for o/p.  Taking amitiza 24 mcg once daily. With 1 loose or formed stool almost daily.  No nausea. Content with this bowel habit  abd pain has improved. Now has mild aches across lower abd. States she is not eating health foods.     Reflux - no  Dysphagia/odynophagia - no   Bowel habits - tends toward constipation since last year. Some improvement with enemas PRN, some improvement with magnesium supplementation, no improvement with colace 4 tabs daily PRN.  Diarrhea on linzess.  GI bleeding - denies hematochezia, hematemesis, melena, BRBPR, black/tarry stools, and coffee ground emesis  NSAID usage - usually avoids.    ROS:  Constitutional: No fevers, no chills, No unintentional weight loss, improvement in fatigue,   ENT: No allergies  CV: No chest pain, no palpitations, no perif. edema, no sob on exertion  Pulm: No cough, No shortness of breath, no wheezes, no sputum  Ophtho: No vision changes  GI: see HPI;   Derm: No rash  Heme: No lymphadenopathy, No bruising  MSK: No arthritis, no muscle pain, no muscle weakness  : No dysuria, No hematuria  Endo: No hot or cold intolerance  Neuro: No syncope, No seizure,       Medical History:  has a past medical history of Anxiety, Depression, and Migraines.    Surgical History:  has a past surgical history that includes Colonoscopy (2009) and Esophagogastroduodenoscopy (2009).    Family History: family history includes Coronary artery disease (age of onset: 60) in her father; Hypertension in her mother; Kidney failure in her sister; Migraines in her mother; No Known Problems in her sister..     Social  History:  reports that  has never smoked. she has never used smokeless tobacco. She reports that she drinks alcohol. She reports that she does not use drugs.    Review of patient's allergies indicates:  No Known Allergies    Current Outpatient Medications   Medication Sig    ALPRAZolam (XANAX) 0.5 MG tablet Take 0.5 tablets (0.25 mg total) by mouth 2 (two) times daily as needed for Anxiety.    azithromycin (Z-NOEMI) 250 MG tablet TK 1 T PO BID    buPROPion (WELLBUTRIN XL) 300 MG 24 hr tablet Take 1 tablet (300 mg total) by mouth once daily.    cefUROXime (CEFTIN) 500 MG tablet TK 1 T PO  BID    DULoxetine (CYMBALTA) 30 MG capsule Take 3 capsules (90 mg total) by mouth once daily. Take 3 tablets of 30mg total to equal 90mg daily    erenumab-aooe (AIMOVIG AUTOINJECTOR) 70 mg/mL AtIn Inject 140 mg into the skin every 28 days.    hydroxychloroquine (PLAQUENIL) 200 mg tablet     ketorolac 15.75 mg/spray Spry 15.75 mg by Nasal route every 6 (six) hours.    lubiprostone (AMITIZA) 24 MCG Cap Take 1 capsule (24 mcg total) by mouth 2 (two) times daily with meals.    meclizine (ANTIVERT) 25 mg tablet Take 1 tablet (25 mg total) by mouth 3 (three) times daily as needed.    nitrofurantoin, macrocrystal-monohydrate, (MACROBID) 100 MG capsule TK 1 C PO Q 12 H FOR 7 DAYS    nystatin (MYCOSTATIN) 500,000 unit Tab TAKE 1 TABLET BY MOUTH BID    OXcarbazepine (TRILEPTAL) 150 MG Tab TK 3 TS PO BID    promethazine (PHENERGAN) 12.5 MG Tab Take 1 tablet (12.5 mg total) by mouth every 6 (six) hours as needed. For nausea    rifAMpin (RIFADIN) 300 MG capsule TK ONE C PO BID    tiZANidine (ZANAFLEX) 4 MG tablet TAKE 1 TABLET BY MOUTH NIGHTLY AS NEEDED FOR HEADACHE. NO ALCOHOL, NO DRIVING WITH MEDICATION    UNABLE TO FIND Cefaly + Electrodes. Wear 20-30 minutes daily.  Dx: Migraines. [email or web address]    ZOLMitriptan (ZOMIG) 5 MG tablet Take 1 tab by mouth at onset of migraine, may repeat dose in 2 hours if needed. Max 2 tabs/day.  "Max 3 days/week.    flu vac [id number] 36mos up,PF, 60 mcg (15 mcg x 4)/0.5 mL Syrg To be administered by the [facility]     Current Facility-Administered Medications   Medication    onabotulinumtoxina injection 200 Units    onabotulinumtoxina injection 200 Units       Objective Findings:    Vital Signs:  /81   Pulse 91   Ht 5' 8" (1.727 m)   Wt 68.5 kg (151 lb 0.2 oz)   BMI 22.96 kg/m²   Body mass index is 22.96 kg/m².    Physical Exam:  General Appearance: Well appearing in no acute distress  Head:   Normocephalic, without obvious abnormality  Eyes:    No scleral icterus, EOMI  ENT: Neck supple, Lips, mucosa, and tongue normal; teeth and gums normal  Lungs: CTA bilaterally in anterior and posterior fields, no wheezes, no crackles.  Heart:  Regular rate and rhythm, S1, S2 normal, no murmurs heard  Abdomen: Soft, non tender, non distended with positive bowel sounds in all four quadrants. No hepatosplenomegaly, ascites, or mass  Extremities: 2+ radial pulses, no clubbing, cyanosis or edema  Skin: No rash to exposed areas  Neurologic: A&Ox4      Labs:  Lab Results   Component Value Date    WBC 4.84 03/21/2018    HGB 12.4 03/21/2018    HCT 37.9 03/21/2018     03/21/2018    CHOL 181 03/21/2018    TRIG 63 03/21/2018    HDL 61 03/21/2018    ALT 42 06/11/2018    AST 31 06/11/2018     06/11/2018    K 3.8 06/11/2018     06/11/2018    CREATININE 0.8 06/11/2018    BUN 9 06/11/2018    CO2 26 06/11/2018    TSH 3.082 03/21/2018    HGBA1C 4.7 03/21/2018       Imaging:  HIDA 6/18/18:  NL  abd us 6/13/18:NL  Endoscopy:    Per pt, EGD in 2009 for diarrhea-NL  Per pt, colon 2009 for diarrhea- NL  Per pt colon 2008 for diarrhea-NL  Assessment:  1. Irritable bowel syndrome with constipation           Recommendations:  1. IBS-improved. continue amitiza. Try OTC IBgard. Start low fodmap diet as per handouts provided.    Follow-up in about 6 months (around 3/28/2019) for IBS-C.      Order summary:  Orders Placed " This Encounter    lubiprostone (AMITIZA) 24 MCG Cap         Thank you so much for allowing me to participate in the care of Cierra Irby    Marcela Cuba, APRN, FNP-C

## 2018-10-12 ENCOUNTER — PATIENT MESSAGE (OUTPATIENT)
Dept: NEUROLOGY | Facility: CLINIC | Age: 38
End: 2018-10-12

## 2018-10-12 ENCOUNTER — PROCEDURE VISIT (OUTPATIENT)
Dept: NEUROLOGY | Facility: CLINIC | Age: 38
End: 2018-10-12
Payer: COMMERCIAL

## 2018-10-12 VITALS
HEIGHT: 68 IN | BODY MASS INDEX: 22.95 KG/M2 | HEART RATE: 83 BPM | WEIGHT: 151.44 LBS | SYSTOLIC BLOOD PRESSURE: 105 MMHG | DIASTOLIC BLOOD PRESSURE: 68 MMHG

## 2018-10-12 PROCEDURE — 96372 THER/PROPH/DIAG INJ SC/IM: CPT | Mod: S$GLB,,, | Performed by: NURSE PRACTITIONER

## 2018-10-12 PROCEDURE — 99213 OFFICE O/P EST LOW 20 MIN: CPT | Mod: 25,S$GLB,, | Performed by: NURSE PRACTITIONER

## 2018-10-12 RX ORDER — PROMETHAZINE HYDROCHLORIDE 25 MG/ML
25 INJECTION, SOLUTION INTRAMUSCULAR; INTRAVENOUS
Status: COMPLETED | OUTPATIENT
Start: 2018-10-12 | End: 2018-10-12

## 2018-10-12 RX ORDER — ONDANSETRON 4 MG/1
4 TABLET, ORALLY DISINTEGRATING ORAL CONTINUOUS PRN
Refills: 4 | COMMUNITY
Start: 2018-09-26 | End: 2018-12-20 | Stop reason: SDUPTHER

## 2018-10-12 RX ORDER — KETOROLAC TROMETHAMINE 30 MG/ML
60 INJECTION, SOLUTION INTRAMUSCULAR; INTRAVENOUS
Status: COMPLETED | OUTPATIENT
Start: 2018-10-12 | End: 2018-10-12

## 2018-10-12 RX ADMIN — KETOROLAC TROMETHAMINE 60 MG: 30 INJECTION, SOLUTION INTRAMUSCULAR; INTRAVENOUS at 09:10

## 2018-10-12 RX ADMIN — PROMETHAZINE HYDROCHLORIDE 25 MG: 25 INJECTION, SOLUTION INTRAMUSCULAR; INTRAVENOUS at 09:10

## 2018-10-12 NOTE — PROCEDURES
SUBJECTIVE:  Patient ID: Cierra Irby  Chief Complaint: Follow-up    History of Present Illness:  Cierra Irby is a 38 y.o. female who presents to clinic alone for follow-up of headaches and Botox injections.     Recommendations made at last Office Visit on 7/17/2018:  - Botox administered in clinic for Chronic Migraine (see below)   - Start Aimovig 140 mg monthly SQ injection  - Continue Cymbalta, Wellbutrin, and Zanaflex for migraine prevention    - For migraine abortive - given Zomig NS   - For rescue - has Sprix available   - MDD/BHAVIN - currently taking wellbutrin and cymbalta with prn xanax, she is established with mental health providers who she sees regularly   - IBS - continue regular f/up with GI, will avoid oral triptans   - Trouble in sleep - encouraged her to resume taking zanaflex nightly as this can aid with sleep, also recommend she mention to her psychiatrist and/or psychiatrist regarding fatigue  - RTC in 12 weeks for repeat Botox injections or sooner if needed     10/12/2018- Interval History:  Patient reports to clinic for repeat Botox injections, however she woke up with a bad migraine and does not wish to move forward with Botox injections today.  States she sent a message via patient portal 30 minutes prior to her appointment and never heard back from anyone.  She is requesting Toradol and Phenergan injections, states in took an Uber to her appointment, hoping she would be able to get a phenergan shot to knock out her current migraine.  She plans to Uber back to her house after her appointment.  Admits she has been under significant stress over the last few weeks, she quit her job last week due to the stress it was causing her and will be losing health insurance at the end of November.     07/17/2018- Interval History:  Headaches have been pretty good since last visit, down to less than once per week.  Only severe migraine since last visit occurred yesterday around 4 PM.  States she  "slept very poorly last night due to the pain.  During the summer months, historically her headaches are better.  States headaches tend to begin to worsen in August/September.  We discussed option of adding preventive therapy to her regimen which she is interested in.  She is happy with the response of her headaches to Botox and does wish to continue with injections as it is giving her significant relief from her migraines.       Secondarily, reports she has been suffering with intense IBS symptoms over the last few months.  She is established and seen regularly with GI.  She has also continued to see her psychiatrist and therapist.  States she has not been sleeping well and has been suffering with severe "fatigue", has not mentioned to neither her psychiatrist, therapist nor PCP regarding poor sleep and fatigue.      04/30/2018- Interval History:  Headaches have been slightly less frequent since last Botox injections, she does not think she has had any severe migraines in the last month or so.  Though she admits her migraines tend to be better around this time of the year.  She is happy with her current medication regimen and does not feel adjustments need to be made at this time. She does want to continue with Botox injections for chronic migraine.  She uses Sprix when she gets a migraine, which she does find helps some.  In the past she has taken Zanaflex which was very helpful when she would start to get headaches in the mornings, this also helps her to sleep at night which she feels is useful as when she gets poor sleep that can trigger a migraine, requests refill of zanaflex.       2/2/2018 - Interval History:  Since Botox injections started on 10/19/2017, she has noticed the frequency of her migraines is going down, thinks she went a whole month without a migraine.  But states she has noticed her headaches have been increasing in frequency over the last week.  She has established care with mental health and is " "regularly seen in clinic, Prozac was discontinued and she was started on Cymbalta 90 mg to supplement Wellbutrin as well as Prazosin nightly for anxiety.  She also attended the 8 week mindfulness class which she also found to be helpful for her anxiety and learning how to cope with the pain of her migraines.  She did not get Cefaly device.  States she does not have any medication to take when she gets a migraine so she just has to wait until it passes.  Overall, she feels the Botox is helping her migraines and would like to repeat the injections.      Initial GUTIERRES HPI:  37 y.o. female with history of migraines, anxiety, and vertigo, presents to clinic alone for evaluation of her headaches.  Headaches initially began when she was in , but then this went away until 2002, when her headaches returned.  Recently moved here from [place], previous patient of [facility] headache center.  She is currently doing Botox, started in May, 3rd round of Botox due in October.  Botox was doing well for treatment, but with the stress of her move to [place], she feels things have kind of gotten mixed up.  She states "this has been one of the worst years I have had, it hasn't been this bad since 2009."  Got very sick in February and had infusion treatments done. Migraines are always right sided, in her forehead, temple, but can be occipitally located.  Pain is described as throbbing, feels like she can feel her veins pulsing.  Onset can be over 30 minutes, but can be longer like hours, depending on what type of headaches she is having.  When she gets a headache, she tries not to take anything, just to prevent the bounce backs. In the past she has taken Hydrocodone and other pain medicatinos. States Hydrocodone works the most, but she tries not to take it as it makes her feel itchy.  Headaches are at least 5x/week, gets a full blown migraine about 1-2 times per week.  Migraines last at least 24 hours, but can be up to 3 days. "  Since beginning the Botox, they have not been lasting 3 days.  Before she started the Botox, she could not get out of the cycle of her headache.  In the past she has experienced an aura, but does not typically experience an aura any longer.  Headaches are typically worse from August to March, and are worsened with movement.  Pain can be anywhere from 3-10 out of 10.  She is currently able to bear children and is not taking oral contraceptive pills, however she is not trying to get pregnant and her and her boyfriend do use protection.  Associated Symptoms - nausea, vertigo, photo/phonophobia, cannot think, vomit, neck pain  Triggers - stress, rain, flying, lemon, detergent smells, strong perfume smells, rollercoaster   Aggravating Factors - moving, looking up and down, any noise/smells/lights   Alleviating Factors - an ice cap, peppermint oil helps with nausea, laying down, eating helps   Head Trauma? Infection? Fever? Cancer? Pregnancy? <-- had 3 minor concussions and then another 4 years ago   Recent Changes - Recently moved from [place] to [place] (for boyfriend),   Sleep - does better when she takes the Zanaflex, does not feel rested in the morning, sleeps on average 6-7 hours per night   Family Hx of Migraines (mom)  Positives in bold: Hx of Kidney Stones, asthma, GI bleed (stomach ulcers in the past), osteoporosis, CAD/MI, CVA/TIA, DM      Treatments Tried and Response  Imitrex - no help, made her feel worse  Maxalt - no  Zomig PO - no  Sprix - kind of worked  Cambia - no   Topamax - gave her many kidney stones   Lithium - was given for her migraine   Wellbutrin - for anxiety  Lexapro - for anxiety   Verapamil - no help   Depakote - no   Magnesium - has to look at dose   Zanaflex - +/-  Zomig NS - helps   Botox - helping   Sprix - helps    Cymbalta - for depression, may be helping with headaches as well   Aimovig - started 2 weeks ago, no change as of yet   Zomig NS -     Current Medications:    ALPRAZolam  (XANAX) 0.5 MG tablet, Take 1 tablet (0.5 mg total) by mouth 2 (two) times daily as needed for Anxiety., Disp: 30 tablet, Rfl: 2    buPROPion (WELLBUTRIN XL) 300 MG 24 hr tablet, Take 1 tablet (300 mg total) by mouth once daily., Disp: 90 tablet, Rfl: 2    DULoxetine (CYMBALTA) 30 MG capsule, Take 3 capsules (90 mg total) by mouth once daily. Take 3 tablets of 30mg total to equal 90mg daily, Disp: 270 capsule, Rfl: 1    erenumab-aooe (AIMOVIG AUTOINJECTOR) 70 mg/mL AtIn, Inject 140 mg into the skin every 28 days., Disp: 2 mL, Rfl: 11    flu vac [id number] 36mos up,PF, 60 mcg (15 mcg x 4)/0.5 mL Syrg, To be administered by the [facility], Disp: 0.5 mL, Rfl: 0    lubiprostone (AMITIZA) 24 MCG Cap, Take 1 capsule (24 mcg total) by mouth 2 (two) times daily with meals., Disp: 180 capsule, Rfl: 3    meclizine (ANTIVERT) 25 mg tablet, Take 1 tablet (25 mg total) by mouth 3 (three) times daily as needed., Disp: 30 tablet, Rfl: 2    promethazine (PHENERGAN) 12.5 MG Tab, Take 1 tablet (12.5 mg total) by mouth every 6 (six) hours as needed. For nausea, Disp: 120 tablet, Rfl: 3    tiZANidine (ZANAFLEX) 4 MG tablet, TAKE 1 TABLET BY MOUTH NIGHTLY AS NEEDED FOR HEADACHE. NO ALCOHOL, NO DRIVING WITH MEDICATION, Disp: 90 tablet, Rfl: 5    ZOLMitriptan (ZOMIG) 5 MG tablet, Take 1 tab by mouth at onset of migraine, may repeat dose in 2 hours if needed. Max 2 tabs/day. Max 3 days/week., Disp: 12 tablet, Rfl: 5    ondansetron (ZOFRAN-ODT) 4 MG TbDL, Take 4 mg by mouth continuous prn., Disp: , Rfl: 4    UNABLE TO FIND, Cefaly + Electrodes. Wear 20-30 minutes daily.  Dx: Migraines. [email or web address], Disp: 1 Device, Rfl: 2    Current Facility-Administered Medications:     onabotulinumtoxina injection 200 Units, 200 Units, Intramuscular, Q90 Days, KIRA Bowers, 200 Units at 07/17/18 0857    onabotulinumtoxina injection 200 Units, 200 Units, Intramuscular, Q90 Days, KIRA Bowers    onabotulinumtoxina injection 200  "Units, 200 Units, Intramuscular, Q90 Days, KIRA Bowers    Review of Systems - as per HPI, otherwise a balanced 10 systems review is negative.    OBJECTIVE:  Vitals:  /68 (BP Location: Left arm, Patient Position: Sitting, BP Method: Large (Automatic))   Pulse 83   Ht 5' 8" (1.727 m)   Wt 68.7 kg (151 lb 7.3 oz)   BMI 23.03 kg/m²     Physical Exam:  Constitutional: she appears well-developed and well-nourished. she is well groomed. NAD   HENT:    Head: Normocephalic and atraumatic  Eyes: Conjunctivae and EOM are normal  Musculoskeletal: Normal range of motion. No joint stiffness.   Skin: Skin is warm and dry.  Psychiatric: Mood and affect are normal    Neuro: Patient is alert and oriented to person, place, and time. Language is intact and fluent.  Recent and remote memory are intact.  Normal attention and concentration.  Facial movement is symmetric.  Gait is normal.     ASSESSMENT:  1. Chronic migraine      PLAN:  - Patient does not wish to have Botox injections today, will come back next week for injections   - Toradol 60 mg IM + Phenergan 25 mg IM administered in clinic, she was monitored for 15 minutes after injections, no adverse effects present.  Patient to uber back to her house from appointment   - RTC in 1 weeks for Botox injections     Orders Placed This Encounter    ketorolac injection 60 mg    promethazine injection 25 mg     All questions and concerns addressed.  Patient verbalizes understanding and is agreeable with the above stated treatment plan.      CC: MD Ellen Martell, KIRA-C [facility]   [phone]    "

## 2018-10-18 ENCOUNTER — PROCEDURE VISIT (OUTPATIENT)
Dept: NEUROLOGY | Facility: CLINIC | Age: 38
End: 2018-10-18
Payer: COMMERCIAL

## 2018-10-18 VITALS
HEART RATE: 97 BPM | BODY MASS INDEX: 22.66 KG/M2 | DIASTOLIC BLOOD PRESSURE: 73 MMHG | WEIGHT: 149.5 LBS | HEIGHT: 68 IN | SYSTOLIC BLOOD PRESSURE: 107 MMHG

## 2018-10-18 PROCEDURE — 64615 CHEMODENERV MUSC MIGRAINE: CPT | Mod: S$GLB,,, | Performed by: NURSE PRACTITIONER

## 2018-10-18 NOTE — PROCEDURES
SUBJECTIVE:  Patient ID: Cierra Irby  Chief Complaint: Botulinum Toxin Injection    History of Present Illness:  Cierra Irby is a 38 y.o. female who presents to clinic alone for follow-up of headaches and Botox injections.     10/18/2018- Interval History:  Headache is much better today, states she would like to get Botox injections today.  She was able to get next round of Aimovig and plans to continue with injections for prevention.       Current Medications:    ALPRAZolam (XANAX) 0.5 MG tablet, Take 1 tablet (0.5 mg total) by mouth 2 (two) times daily as needed for Anxiety., Disp: 30 tablet, Rfl: 2    buPROPion (WELLBUTRIN XL) 300 MG 24 hr tablet, Take 1 tablet (300 mg total) by mouth once daily., Disp: 90 tablet, Rfl: 2    DULoxetine (CYMBALTA) 30 MG capsule, Take 3 capsules (90 mg total) by mouth once daily. Take 3 tablets of 30mg total to equal 90mg daily, Disp: 270 capsule, Rfl: 1    erenumab-aooe (AIMOVIG AUTOINJECTOR) 70 mg/mL AtIn, Inject 140 mg into the skin every 28 days., Disp: 2 mL, Rfl: 11    flu vac [id number] 36mos up,PF, 60 mcg (15 mcg x 4)/0.5 mL Syrg, To be administered by the [facility], Disp: 0.5 mL, Rfl: 0    lubiprostone (AMITIZA) 24 MCG Cap, Take 1 capsule (24 mcg total) by mouth 2 (two) times daily with meals., Disp: 180 capsule, Rfl: 3    meclizine (ANTIVERT) 25 mg tablet, Take 1 tablet (25 mg total) by mouth 3 (three) times daily as needed., Disp: 30 tablet, Rfl: 2    ondansetron (ZOFRAN-ODT) 4 MG TbDL, Take 4 mg by mouth continuous prn., Disp: , Rfl: 4    promethazine (PHENERGAN) 12.5 MG Tab, Take 1 tablet (12.5 mg total) by mouth every 6 (six) hours as needed. For nausea, Disp: 120 tablet, Rfl: 3    tiZANidine (ZANAFLEX) 4 MG tablet, TAKE 1 TABLET BY MOUTH NIGHTLY AS NEEDED FOR HEADACHE. NO ALCOHOL, NO DRIVING WITH MEDICATION, Disp: 90 tablet, Rfl: 5    UNABLE TO FIND, Cefaly + Electrodes. Wear 20-30 minutes daily.  Dx: Migraines. [email or web address], Disp: 1 Device, Rfl: 2     "ZOLMitriptan (ZOMIG) 5 MG tablet, Take 1 tab by mouth at onset of migraine, may repeat dose in 2 hours if needed. Max 2 tabs/day. Max 3 days/week., Disp: 12 tablet, Rfl: 5    Current Facility-Administered Medications:     onabotulinumtoxina injection 200 Units, 200 Units, Intramuscular, Q90 Days, Ellen Vulevich, FNP, 200 Units at 07/17/18 0857    onabotulinumtoxina injection 200 Units, 200 Units, Intramuscular, Q90 Days, Ellen Vulevich, FNP, 200 Units at 10/18/18 1231    onabotulinumtoxina injection 200 Units, 200 Units, Intramuscular, Q90 Days, Ellen Vulevich, FNP    Review of Systems - as per HPI, otherwise a balanced 10 systems review is negative.    OBJECTIVE:  Vitals:  /73 (BP Location: Right arm, Patient Position: Sitting, BP Method: Large (Automatic))   Pulse 97   Ht 5' 8" (1.727 m)   Wt 67.8 kg (149 lb 7.6 oz)   BMI 22.73 kg/m²     Physical Exam:  Constitutional: she appears well-developed and well-nourished. she is well groomed. NAD     ASSESSMENT:  1. Chronic migraine      PLAN:  - Botox administered in clinic for Chronic Migraine (see below)   - Continue all medications as directed  - F/up as needed     All questions and concerns addressed.  Patient verbalizes understanding and is agreeable with the above stated treatment plan.      PROCEDURE NOTE:  BOTOX was performed as an indicated therapy for intractable chronic migraine headaches given that the patient failed more than 2 headache medications    Two patient identifiers were confirmed with the patient prior to performing this procedure. A time out to determine correct patient and and agreement on procedure performed was conducted prior to the procedure.      Botulinum Toxin Injection Procedure   Procedure: Chemical neurolysis   After risks and benefits were explained including bleeding, infection, worsening of pain, damage to the areas being injected, weakness of muscles, loss of muscle control, dysphagia if injecting the head " or neck, facial droop if injecting the facial area, painful injection, allergic or other reaction to the medications being injected, and the failure of the procedure to help the problem, a signed consent was obtained.   The patient was placed in a comfortable area and the sites to be treated were identified.The area to be treated was prepped three times with alcohol and the alcohol allowed to dry. Next, a 30 gauge needle was used to inject the medication in the area to be treated.      Total Botox used: 155 Units   Botox wastage: 45 Units     Injection sites:    muscle bilaterally ( a total of 10 units divided into 2 sites)   Procerus muscle (5 units)   Frontalis muscle bilaterally (a total of 20 units divided into 4 sites)   Temporalis muscle bilaterally (a total of 40 units divided into 8 sites)   Occipitalis muscle bilaterally (a total of 30 units divided into 6 sites)   Cervical paraspinal muscles (a total of 20 units divided into 4 sites)   Trapezius muscle bilaterally (a total of 30 units divided into 6 sites)   Complications: none   RTC for the next Botox injection: as needed      CC: MD Ellen Martell, FNP-C  [facility]   [phone]

## 2018-10-19 ENCOUNTER — OFFICE VISIT (OUTPATIENT)
Dept: PSYCHIATRY | Facility: CLINIC | Age: 38
End: 2018-10-19
Payer: COMMERCIAL

## 2018-10-19 VITALS
WEIGHT: 150.56 LBS | BODY MASS INDEX: 22.89 KG/M2 | HEART RATE: 82 BPM | DIASTOLIC BLOOD PRESSURE: 67 MMHG | SYSTOLIC BLOOD PRESSURE: 110 MMHG

## 2018-10-19 DIAGNOSIS — F43.10 PTSD (POST-TRAUMATIC STRESS DISORDER): Primary | ICD-10-CM

## 2018-10-19 DIAGNOSIS — F33.1 MDD (MAJOR DEPRESSIVE DISORDER), RECURRENT EPISODE, MODERATE: ICD-10-CM

## 2018-10-19 DIAGNOSIS — F41.1 GAD (GENERALIZED ANXIETY DISORDER): ICD-10-CM

## 2018-10-19 PROCEDURE — 99214 OFFICE O/P EST MOD 30 MIN: CPT | Mod: S$GLB,,, | Performed by: PSYCHIATRY & NEUROLOGY

## 2018-10-19 PROCEDURE — 3008F BODY MASS INDEX DOCD: CPT | Mod: CPTII,S$GLB,, | Performed by: PSYCHIATRY & NEUROLOGY

## 2018-10-19 PROCEDURE — 99999 PR PBB SHADOW E&M-EST. PATIENT-LVL II: CPT | Mod: PBBFAC,,, | Performed by: PSYCHIATRY & NEUROLOGY

## 2018-10-19 RX ORDER — HYDROXYZINE PAMOATE 25 MG/1
25 CAPSULE ORAL NIGHTLY PRN
Qty: 90 CAPSULE | Refills: 0 | Status: SHIPPED | OUTPATIENT
Start: 2018-10-19

## 2018-10-19 NOTE — PROGRESS NOTES
10/19/2018 8:26 AM   Cierra Irby   1980   [id number]           OUTPATIENT PSYCHIATRY FOLLOW- UP VISIT    Reason for Encounter:  Cierra Irby, a 38 y.o. female,who presents today for follow up of anxiety, depression and PTSD  .  Met with patient.  Recently seen by Lita Myers NP and Teetee Bolivar MD.  Prior documentation reviewed.     Interval History and Content of Current Session:    Today,  Patient here for one visit prior to moving to [place] to be close to family and friends.  Patient moved to [facility] last year for a boyfriend, and the relationship has dissolved.  Never felt a part of the community here and denies any friends.  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  Reports she is hopeless that things can improve.  However, appears engaged and motivated for treatment. Pt reports continues to struggle with PTSD symptoms, including emotional numbness, fear of the future, and panic attacks.  Has some good days and bad days.  No longer seeing psychotherapist as she moved.  Patient used to follow at [facility] and recommended it highly.  Describes trouble settling down for sleep and return of nightmares.  Asks about going back on Prazosin, something she has taken in the past, and worked well for her.  Is looking to get established with a therapist.  Denies suicidal thoughts.                         Social stressors:  Unemployed, quit job in Marketing  Breakup with boyfriend      Psychiatric Review Of Systems - Is patient experiencing or having changes in:    Symptoms of Depression: diminished mood or loss of interest/anhedonia; diminished energy, change in sleep, hopelessness    Symptoms of BHAVIN: excessive anxiety/worry/fear, more days than not, about numerous issues, difficult to control, with fatigue, sleep disturbance; causes functionally impairing distress     Denies symptoms of maura or  psychosis     Sleep: Problems with initiation, maintenance, and nightmares    Risk Parameters:  Patient reports no suicidal ideation  Patient reports no homicidal ideation  Patient reports no self-injurious behavior  Patient reports no violent behavior    Psychotropic medication review  Previous Trials- Paxil, Lexapro, unsure if they worked well, Prazosin for nightmares, Vistaril for insomnia     Current meds-  Cymbalta 90 mg daily, Wellbutrin 300 mg daily, Xanax 0.5 mg PRN panic attacks    Substance use  Tobacco- denied  ETOH- denied   Illicit substances- denied    Review of Systems     Past Medical, Family and Social History: The patient's past medical, family and social history have been reviewed and updated as appropriate within the electronic medical record - see encounter notes.    Compliance: yes    Side effects: None      Objective     Constitutional  Vitals:  Most recent vital signs, dated less than 90 days prior to this appointment, were reviewed.    Vitals:    10/19/18 0826   BP: 110/67   Pulse: 82   Weight: 68.3 kg (150 lb 9.2 oz)            Laboratory Data: reviewed most recent labs and noted any abnormalities.    Medications:  Outpatient Encounter Medications as of 10/19/2018   Medication Sig Dispense Refill    ALPRAZolam (XANAX) 0.5 MG tablet Take 1 tablet (0.5 mg total) by mouth 2 (two) times daily as needed for Anxiety. 30 tablet 2    buPROPion (WELLBUTRIN XL) 300 MG 24 hr tablet Take 1 tablet (300 mg total) by mouth once daily. 90 tablet 2    DULoxetine (CYMBALTA) 30 MG capsule Take 3 capsules (90 mg total) by mouth once daily. Take 3 tablets of 30mg total to equal 90mg daily 270 capsule 1    erenumab-aooe (AIMOVIG AUTOINJECTOR) 70 mg/mL AtIn Inject 140 mg into the skin every 28 days. 2 mL 11    flu vac [id number] 36mos up,PF, 60 mcg (15 mcg x 4)/0.5 mL Syrg To be administered by the [facility] 0.5 mL 0    lubiprostone (AMITIZA) 24 MCG Cap Take 1 capsule (24 mcg total) by mouth 2 (two) times daily  with meals. 180 capsule 3    meclizine (ANTIVERT) 25 mg tablet Take 1 tablet (25 mg total) by mouth 3 (three) times daily as needed. 30 tablet 2    ondansetron (ZOFRAN-ODT) 4 MG TbDL Take 4 mg by mouth continuous prn.  4    promethazine (PHENERGAN) 12.5 MG Tab Take 1 tablet (12.5 mg total) by mouth every 6 (six) hours as needed. For nausea 120 tablet 3    tiZANidine (ZANAFLEX) 4 MG tablet TAKE 1 TABLET BY MOUTH NIGHTLY AS NEEDED FOR HEADACHE. NO ALCOHOL, NO DRIVING WITH MEDICATION 90 tablet 5    UNABLE TO FIND Cefaly + Electrodes. Wear 20-30 minutes daily.  Dx: Migraines. [email or web address].[email or web address] 1 Device 2    ZOLMitriptan (ZOMIG) 5 MG tablet Take 1 tab by mouth at onset of migraine, may repeat dose in 2 hours if needed. Max 2 tabs/day. Max 3 days/week. 12 tablet 5     Facility-Administered Encounter Medications as of 10/19/2018   Medication Dose Route Frequency Provider Last Rate Last Dose    onabotulinumtoxina injection 200 Units  200 Units Intramuscular Q90 Days Ellen Bueno, FNP   200 Units at 07/17/18 0857    onabotulinumtoxina injection 200 Units  200 Units Intramuscular Q90 Days Ellen Bueno, FNP   200 Units at 10/18/18 1231    onabotulinumtoxina injection 200 Units  200 Units Intramuscular Q90 Days Ellen Bueno, FNP           Allergy:  Review of patient's allergies indicates:  No Known Allergies    Nutritional Screening: Considering the patient's height and weight, medications, medical history and preferences, should a referral be made to the dietitian? no    Review of Systems - History obtained from the patient  General ROS: negative  Respiratory ROS: no cough, shortness of breath, or wheezing  Cardiovascular ROS: no chest pain or dyspnea on exertion  Gastrointestinal ROS: no abdominal pain, change in bowel habits, or black or bloody stools  Musculoskeletal ROS:no spasicity, no rigidity; non-ataxic  Neurological ROS: no TIA or stroke symptoms    Mental Status Exam:  Appearance: unremarkable,  "age appropriate, casually dressed  Behavior/Cooperation:appropriate friendly and cooperative   Speech: hesitant, soft volume, appropriate tone, spontaneous  Language: uses words appropriately; NO aphasia or dysarthria  Mood: "anxious"  Affect:   dysthymic congruent with mood and appropriate to situation/content   Thought Process:  normal and logical  Thought Content: normal, no suicidality, no homicidality, delusions, or paranoia  Sensorium:  Awake  Alert and Oriented: x3 grossly intact  Memory: Intact to conversation both recent and remote  Attention/concentration: appropriate for age/education and intact to conversation  Insight: Intact  Judgment:Intact      Assessment and Diagnosis   Status/Progress: Based on the examination today, the patient's problem(s) is/are adequately but not ideally controlled.  New problems have not been presented today.   Lack of compliance are not complicating management of the primary condition.  There are no active rule-out diagnoses for this patient at this time.     General Impression:   PTSD  BHAVIN  MDD, recurrent, moderate    Intervention/Counseling/Treatment Plan   · Medication Management:   · Continue Duloxetine 90mg po daily, last prescribed by Lita Myers NP with refills   · Continue Wellbutrin 300 mg po daily, last prescribed by Lita Myers NP with refills  · Continue Xanax 0.5mg PRN daily for panic attacks, last prescribed by Lita Myers NP with refills  · Patient referred to JERAD White for psychotherapy.  Also provided resources for [facility] [place] as patient requires about group therapy and support groups   · Start Prazosin 1 mg qhs.  Patient reports a large quantity of leftover medication that will bridge her until she establishes with a new psychiatrist in [facility]  · Start Vistaril 25-50 mg qhs PRN insomnia   · The treatment plan and follow up plan were reviewed with the patient.  · Discussed with patient informed consent, risks vs. benefits, alternative treatments, side effect " profile and the inherent unpredictability of individual responses to these treatments. The patient expresses understanding of the above and displays the capacity to agree with this current plan and had no other questions.  · Encouraged Patient to keep future appointments.   · Take medications as prescribed and abstain from substance abuse.   · In the event of an emergency patient was advised to go to the emergency room.    Return to Clinic: Patient moving to [facility].  Plans to establish care with [facility] mental health there next month     > than 50% of total time spend on coordination of care and counseling   (which included pts differential diagnosis and prognosis for psychiatric conditions, risks, benefits of treatments, instructions and adherence to treatment plan, risk reduction, reviewing current psychiatric medication regimen, medical problems and social stressors. In addtion to possible discussion with other healthcare provider/s)    Add on Psychotherapy time:0  Total Face time: 30 minutes     Hollie Hernandez MD   [facility]   10/19/2018 8:26 AM

## 2018-10-21 ENCOUNTER — PATIENT MESSAGE (OUTPATIENT)
Dept: NEUROLOGY | Facility: CLINIC | Age: 38
End: 2018-10-21

## 2018-10-21 DIAGNOSIS — G43.011 INTRACTABLE MIGRAINE WITHOUT AURA AND WITH STATUS MIGRAINOSUS: Primary | ICD-10-CM

## 2018-10-22 ENCOUNTER — CLINICAL SUPPORT (OUTPATIENT)
Dept: NEUROLOGY | Facility: CLINIC | Age: 38
End: 2018-10-22
Payer: COMMERCIAL

## 2018-10-22 ENCOUNTER — TELEPHONE (OUTPATIENT)
Dept: NEUROLOGY | Facility: CLINIC | Age: 38
End: 2018-10-22

## 2018-10-22 DIAGNOSIS — G43.011 INTRACTABLE MIGRAINE WITHOUT AURA AND WITH STATUS MIGRAINOSUS: Primary | ICD-10-CM

## 2018-10-22 DIAGNOSIS — G43.511 MIGRAINE AURA, PERSISTENT, INTRACTABLE, WITH STATUS MIGRAINOSUS: Primary | ICD-10-CM

## 2018-10-22 PROCEDURE — 96372 THER/PROPH/DIAG INJ SC/IM: CPT | Mod: S$GLB,,, | Performed by: NURSE PRACTITIONER

## 2018-10-22 RX ORDER — METHYLPREDNISOLONE ACETATE 80 MG/ML
80 INJECTION, SUSPENSION INTRA-ARTICULAR; INTRALESIONAL; INTRAMUSCULAR; SOFT TISSUE
Status: COMPLETED | OUTPATIENT
Start: 2018-10-22 | End: 2018-10-22

## 2018-10-22 RX ORDER — KETOROLAC TROMETHAMINE 30 MG/ML
60 INJECTION, SOLUTION INTRAMUSCULAR; INTRAVENOUS
Status: COMPLETED | OUTPATIENT
Start: 2018-10-22 | End: 2018-10-22

## 2018-10-22 RX ADMIN — METHYLPREDNISOLONE ACETATE 80 MG: 80 INJECTION, SUSPENSION INTRA-ARTICULAR; INTRALESIONAL; INTRAMUSCULAR; SOFT TISSUE at 01:10

## 2018-10-22 RX ADMIN — KETOROLAC TROMETHAMINE 60 MG: 30 INJECTION, SOLUTION INTRAMUSCULAR; INTRAVENOUS at 01:10

## 2018-10-22 NOTE — NURSING
Patient seen in clinic for injections.Patient rates pain as a 8 on scale-she states her migraine started this week-end.2 Patient identifiers and allergies reviewed with her.Dexamethasone 80 mg given IM in right outer gluteus.Site clear.Band aid applied.Toradol 60 mg given in left outer gluteus.Site clear.Band aid applied.Patient stated that she felt a little flushed after injection,and Nursing explained to her that this could be related to the steroids.Patient waited in room with Nursing present.No distress-she stated feeling of being flushed has disappeared.Skin warm and dry with no redness or flushing noted.She rated pain upon departure a 6.Nursing escorted Patient out of clinic.She is to get an Uber home.She was instructed to stay hydrated and to rest.She was instructed to notify clinic for any further questions,needs,or concerns.

## 2018-10-22 NOTE — TELEPHONE ENCOUNTER
Spoke to Patient and informed her that she will not need a person to drive her,as she will not be getting Phenergan.

## 2018-10-24 RX ORDER — PREDNISONE 20 MG/1
TABLET ORAL
Qty: 12 TABLET | Refills: 0 | Status: SHIPPED | OUTPATIENT
Start: 2018-10-24

## 2018-10-29 ENCOUNTER — PATIENT MESSAGE (OUTPATIENT)
Dept: NEUROLOGY | Facility: CLINIC | Age: 38
End: 2018-10-29

## 2018-10-29 DIAGNOSIS — G43.011 INTRACTABLE MIGRAINE WITHOUT AURA AND WITH STATUS MIGRAINOSUS: Primary | ICD-10-CM

## 2018-10-30 ENCOUNTER — PATIENT MESSAGE (OUTPATIENT)
Dept: NEUROLOGY | Facility: CLINIC | Age: 38
End: 2018-10-30

## 2018-10-30 RX ORDER — HYDROXYZINE PAMOATE 50 MG/1
CAPSULE ORAL
Qty: 9 CAPSULE | Refills: 0 | Status: SHIPPED | OUTPATIENT
Start: 2018-10-30 | End: 2018-11-27 | Stop reason: SDUPTHER

## 2018-11-16 ENCOUNTER — TELEPHONE (OUTPATIENT)
Dept: PHARMACY | Facility: CLINIC | Age: 38
End: 2018-11-16

## 2018-11-16 NOTE — TELEPHONE ENCOUNTER
Patient declined initial Aimovig consult since she is experienced on therapy. She received 1st dose from [facility] and due for her second injection at the end of the month. Scheduled to  from [facility] on 11/19. $0 copay. No questions or concerns with taking the medication as prescribed.     Cornell Anaya, PharmD  Clinical Pharmacist   [facility]   P: [phone]

## 2018-11-27 ENCOUNTER — PATIENT MESSAGE (OUTPATIENT)
Dept: PSYCHIATRY | Facility: CLINIC | Age: 38
End: 2018-11-27

## 2018-11-27 DIAGNOSIS — G43.011 INTRACTABLE MIGRAINE WITHOUT AURA AND WITH STATUS MIGRAINOSUS: ICD-10-CM

## 2018-11-27 RX ORDER — HYDROXYZINE PAMOATE 50 MG/1
CAPSULE ORAL
Qty: 9 CAPSULE | Refills: 0 | Status: SHIPPED | OUTPATIENT
Start: 2018-11-27

## 2018-11-28 ENCOUNTER — PATIENT MESSAGE (OUTPATIENT)
Dept: NEUROLOGY | Facility: CLINIC | Age: 38
End: 2018-11-28

## 2018-11-28 DIAGNOSIS — G47.9 TROUBLE IN SLEEPING: ICD-10-CM

## 2018-11-28 RX ORDER — MECLIZINE HYDROCHLORIDE 25 MG/1
TABLET ORAL
Qty: 30 TABLET | Refills: 0 | Status: SHIPPED | OUTPATIENT
Start: 2018-11-28

## 2018-11-28 RX ORDER — TIZANIDINE 4 MG/1
TABLET ORAL
Qty: 90 TABLET | Refills: 3 | Status: SHIPPED | OUTPATIENT
Start: 2018-11-28

## 2018-11-30 NOTE — PROGRESS NOTES
STAFF COMMENTS: I have discussed pt with Dr. Hernandez and reviewed the history and exam. I agree and concur with the assessment and plan.

## 2018-12-13 ENCOUNTER — TELEPHONE (OUTPATIENT)
Dept: PHARMACY | Facility: CLINIC | Age: 38
End: 2018-12-13

## 2018-12-13 NOTE — TELEPHONE ENCOUNTER
Reached Ms. Irby for Aimovig refill. Patient reports she will be in [place] this month into January. [facility] cannot ship to [place] so prescription transferred to [facility]. [facility] will followup with patient in January.

## 2018-12-19 ENCOUNTER — PATIENT MESSAGE (OUTPATIENT)
Dept: NEUROLOGY | Facility: CLINIC | Age: 38
End: 2018-12-19

## 2018-12-20 RX ORDER — ONDANSETRON 4 MG/1
4 TABLET, ORALLY DISINTEGRATING ORAL EVERY 8 HOURS PRN
Qty: 80 TABLET | Refills: 2 | Status: SHIPPED | OUTPATIENT
Start: 2018-12-20

## 2019-01-22 ENCOUNTER — TELEPHONE (OUTPATIENT)
Dept: PHARMACY | Facility: CLINIC | Age: 39
End: 2019-01-22

## 2019-01-29 NOTE — TELEPHONE ENCOUNTER
Call attempt 2 for Aimovig refill and follow-up. LVM and sent [employer] message.     Kentrell Davila, PharmD  Clinical Pharmacist  [facility]  P: [phone]

## 2019-03-08 ENCOUNTER — TELEPHONE (OUTPATIENT)
Dept: GASTROENTEROLOGY | Facility: CLINIC | Age: 39
End: 2019-03-08

## 2019-04-01 ENCOUNTER — PATIENT MESSAGE (OUTPATIENT)
Dept: NEUROLOGY | Facility: CLINIC | Age: 39
End: 2019-04-01

## 2019-07-19 ENCOUNTER — TELEPHONE (OUTPATIENT)
Dept: ADMINISTRATIVE | Facility: HOSPITAL | Age: 39
End: 2019-07-19

## 2020-05-15 DIAGNOSIS — Z12.39 BREAST CANCER SCREENING: ICD-10-CM

## 2020-10-05 ENCOUNTER — PATIENT MESSAGE (OUTPATIENT)
Dept: ADMINISTRATIVE | Facility: HOSPITAL | Age: 40
End: 2020-10-05

## 2021-01-04 ENCOUNTER — PATIENT MESSAGE (OUTPATIENT)
Dept: ADMINISTRATIVE | Facility: HOSPITAL | Age: 41
End: 2021-01-04

## 2021-04-05 ENCOUNTER — PATIENT MESSAGE (OUTPATIENT)
Dept: ADMINISTRATIVE | Facility: HOSPITAL | Age: 41
End: 2021-04-05

## 2021-04-26 ENCOUNTER — PATIENT MESSAGE (OUTPATIENT)
Dept: RESEARCH | Facility: HOSPITAL | Age: 41
End: 2021-04-26